# Patient Record
Sex: MALE | Race: WHITE | NOT HISPANIC OR LATINO | Employment: OTHER | URBAN - METROPOLITAN AREA
[De-identification: names, ages, dates, MRNs, and addresses within clinical notes are randomized per-mention and may not be internally consistent; named-entity substitution may affect disease eponyms.]

---

## 2022-04-19 ENCOUNTER — HOSPITAL ENCOUNTER (OUTPATIENT)
Facility: HOSPITAL | Age: 27
Setting detail: OBSERVATION
Discharge: HOME | End: 2022-04-23
Attending: EMERGENCY MEDICINE | Admitting: INTERNAL MEDICINE

## 2022-04-19 ENCOUNTER — APPOINTMENT (OUTPATIENT)
Dept: MRI IMAGING | Facility: HOSPITAL | Age: 27
End: 2022-04-19

## 2022-04-19 ENCOUNTER — APPOINTMENT (OUTPATIENT)
Dept: CT IMAGING | Facility: HOSPITAL | Age: 27
End: 2022-04-19

## 2022-04-19 DIAGNOSIS — R53.1 WEAKNESS: ICD-10-CM

## 2022-04-19 DIAGNOSIS — R55 SYNCOPE, UNSPECIFIED SYNCOPE TYPE: ICD-10-CM

## 2022-04-19 DIAGNOSIS — R56.9 SEIZURE-LIKE ACTIVITY: Primary | ICD-10-CM

## 2022-04-19 LAB
ALBUMIN SERPL-MCNC: 4.6 G/DL (ref 3.5–5.2)
ALBUMIN/GLOB SERPL: 2 G/DL
ALP SERPL-CCNC: 95 U/L (ref 39–117)
ALT SERPL W P-5'-P-CCNC: 17 U/L (ref 1–41)
AMPHET+METHAMPHET UR QL: NEGATIVE
AMPHETAMINES UR QL: NEGATIVE
ANION GAP SERPL CALCULATED.3IONS-SCNC: 13 MMOL/L (ref 5–15)
AST SERPL-CCNC: 19 U/L (ref 1–40)
BARBITURATES UR QL SCN: NEGATIVE
BASOPHILS # BLD AUTO: 0.03 10*3/MM3 (ref 0–0.2)
BASOPHILS NFR BLD AUTO: 0.3 % (ref 0–1.5)
BENZODIAZ UR QL SCN: NEGATIVE
BILIRUB SERPL-MCNC: 0.3 MG/DL (ref 0–1.2)
BILIRUB UR QL STRIP: NEGATIVE
BUN SERPL-MCNC: 14 MG/DL (ref 6–20)
BUN/CREAT SERPL: 14.4 (ref 7–25)
BUPRENORPHINE SERPL-MCNC: NEGATIVE NG/ML
CALCIUM SPEC-SCNC: 9.2 MG/DL (ref 8.6–10.5)
CANNABINOIDS SERPL QL: NEGATIVE
CHLORIDE SERPL-SCNC: 104 MMOL/L (ref 98–107)
CLARITY UR: CLEAR
CO2 SERPL-SCNC: 24 MMOL/L (ref 22–29)
COCAINE UR QL: NEGATIVE
COLOR UR: YELLOW
CREAT SERPL-MCNC: 0.97 MG/DL (ref 0.76–1.27)
DEPRECATED RDW RBC AUTO: 44.2 FL (ref 37–54)
EGFRCR SERPLBLD CKD-EPI 2021: 110.4 ML/MIN/1.73
EOSINOPHIL # BLD AUTO: 0.09 10*3/MM3 (ref 0–0.4)
EOSINOPHIL NFR BLD AUTO: 1 % (ref 0.3–6.2)
ERYTHROCYTE [DISTWIDTH] IN BLOOD BY AUTOMATED COUNT: 12.5 % (ref 12.3–15.4)
ETHANOL BLD-MCNC: <10 MG/DL (ref 0–10)
FLUAV SUBTYP SPEC NAA+PROBE: NOT DETECTED
FLUBV RNA ISLT QL NAA+PROBE: NOT DETECTED
GLOBULIN UR ELPH-MCNC: 2.3 GM/DL
GLUCOSE SERPL-MCNC: 90 MG/DL (ref 65–99)
GLUCOSE UR STRIP-MCNC: NEGATIVE MG/DL
HCT VFR BLD AUTO: 41.6 % (ref 37.5–51)
HGB BLD-MCNC: 13.6 G/DL (ref 13–17.7)
HGB UR QL STRIP.AUTO: NEGATIVE
HOLD SPECIMEN: NORMAL
IMM GRANULOCYTES # BLD AUTO: 0.03 10*3/MM3 (ref 0–0.05)
IMM GRANULOCYTES NFR BLD AUTO: 0.3 % (ref 0–0.5)
KETONES UR QL STRIP: ABNORMAL
LEUKOCYTE ESTERASE UR QL STRIP.AUTO: NEGATIVE
LYMPHOCYTES # BLD AUTO: 2.15 10*3/MM3 (ref 0.7–3.1)
LYMPHOCYTES NFR BLD AUTO: 24.3 % (ref 19.6–45.3)
MAGNESIUM SERPL-MCNC: 2.1 MG/DL (ref 1.6–2.6)
MCH RBC QN AUTO: 31.6 PG (ref 26.6–33)
MCHC RBC AUTO-ENTMCNC: 32.7 G/DL (ref 31.5–35.7)
MCV RBC AUTO: 96.7 FL (ref 79–97)
METHADONE UR QL SCN: NEGATIVE
MONOCYTES # BLD AUTO: 0.73 10*3/MM3 (ref 0.1–0.9)
MONOCYTES NFR BLD AUTO: 8.3 % (ref 5–12)
NEUTROPHILS NFR BLD AUTO: 5.8 10*3/MM3 (ref 1.7–7)
NEUTROPHILS NFR BLD AUTO: 65.8 % (ref 42.7–76)
NITRITE UR QL STRIP: NEGATIVE
NRBC BLD AUTO-RTO: 0 /100 WBC (ref 0–0.2)
OPIATES UR QL: NEGATIVE
OXYCODONE UR QL SCN: NEGATIVE
PCP UR QL SCN: NEGATIVE
PH UR STRIP.AUTO: 6.5 [PH] (ref 5–8)
PLATELET # BLD AUTO: 283 10*3/MM3 (ref 140–450)
PMV BLD AUTO: 9.3 FL (ref 6–12)
POTASSIUM SERPL-SCNC: 3.6 MMOL/L (ref 3.5–5.2)
PROPOXYPH UR QL: NEGATIVE
PROT SERPL-MCNC: 6.9 G/DL (ref 6–8.5)
PROT UR QL STRIP: NEGATIVE
RBC # BLD AUTO: 4.3 10*6/MM3 (ref 4.14–5.8)
SARS-COV-2 RNA PNL SPEC NAA+PROBE: NOT DETECTED
SODIUM SERPL-SCNC: 141 MMOL/L (ref 136–145)
SP GR UR STRIP: 1.01 (ref 1–1.03)
TRICYCLICS UR QL SCN: NEGATIVE
UROBILINOGEN UR QL STRIP: ABNORMAL
WBC NRBC COR # BLD: 8.83 10*3/MM3 (ref 3.4–10.8)
WHOLE BLOOD HOLD SPECIMEN: NORMAL
WHOLE BLOOD HOLD SPECIMEN: NORMAL

## 2022-04-19 PROCEDURE — C9803 HOPD COVID-19 SPEC COLLECT: HCPCS

## 2022-04-19 PROCEDURE — 0 LEVETIRACETAM IN NACL 0.75% 1000 MG/100ML SOLUTION: Performed by: PHYSICIAN ASSISTANT

## 2022-04-19 PROCEDURE — G0378 HOSPITAL OBSERVATION PER HR: HCPCS

## 2022-04-19 PROCEDURE — 99219 PR INITIAL OBSERVATION CARE/DAY 50 MINUTES: CPT | Performed by: STUDENT IN AN ORGANIZED HEALTH CARE EDUCATION/TRAINING PROGRAM

## 2022-04-19 PROCEDURE — 70450 CT HEAD/BRAIN W/O DYE: CPT

## 2022-04-19 PROCEDURE — 0 GADOBENATE DIMEGLUMINE 529 MG/ML SOLUTION: Performed by: EMERGENCY MEDICINE

## 2022-04-19 PROCEDURE — A9577 INJ MULTIHANCE: HCPCS | Performed by: EMERGENCY MEDICINE

## 2022-04-19 PROCEDURE — 80306 DRUG TEST PRSMV INSTRMNT: CPT | Performed by: NURSE PRACTITIONER

## 2022-04-19 PROCEDURE — 96375 TX/PRO/DX INJ NEW DRUG ADDON: CPT

## 2022-04-19 PROCEDURE — 80053 COMPREHEN METABOLIC PANEL: CPT | Performed by: EMERGENCY MEDICINE

## 2022-04-19 PROCEDURE — 96374 THER/PROPH/DIAG INJ IV PUSH: CPT

## 2022-04-19 PROCEDURE — 99284 EMERGENCY DEPT VISIT MOD MDM: CPT

## 2022-04-19 PROCEDURE — 93005 ELECTROCARDIOGRAM TRACING: CPT | Performed by: NURSE PRACTITIONER

## 2022-04-19 PROCEDURE — 81003 URINALYSIS AUTO W/O SCOPE: CPT | Performed by: NURSE PRACTITIONER

## 2022-04-19 PROCEDURE — 70553 MRI BRAIN STEM W/O & W/DYE: CPT

## 2022-04-19 PROCEDURE — 85025 COMPLETE CBC W/AUTO DIFF WBC: CPT | Performed by: EMERGENCY MEDICINE

## 2022-04-19 PROCEDURE — 83735 ASSAY OF MAGNESIUM: CPT | Performed by: NURSE PRACTITIONER

## 2022-04-19 PROCEDURE — 72125 CT NECK SPINE W/O DYE: CPT

## 2022-04-19 PROCEDURE — 25010000002 LORAZEPAM PER 2 MG: Performed by: EMERGENCY MEDICINE

## 2022-04-19 PROCEDURE — 87636 SARSCOV2 & INF A&B AMP PRB: CPT | Performed by: NURSE PRACTITIONER

## 2022-04-19 PROCEDURE — 82077 ASSAY SPEC XCP UR&BREATH IA: CPT | Performed by: EMERGENCY MEDICINE

## 2022-04-19 RX ORDER — LORAZEPAM 2 MG/ML
1 INJECTION INTRAMUSCULAR ONCE
Status: COMPLETED | OUTPATIENT
Start: 2022-04-19 | End: 2022-04-19

## 2022-04-19 RX ORDER — ONDANSETRON 4 MG/1
4 TABLET, FILM COATED ORAL EVERY 6 HOURS PRN
Status: DISCONTINUED | OUTPATIENT
Start: 2022-04-19 | End: 2022-04-23 | Stop reason: HOSPADM

## 2022-04-19 RX ORDER — CHOLECALCIFEROL (VITAMIN D3) 125 MCG
5 CAPSULE ORAL NIGHTLY PRN
Status: DISCONTINUED | OUTPATIENT
Start: 2022-04-19 | End: 2022-04-23 | Stop reason: HOSPADM

## 2022-04-19 RX ORDER — SODIUM CHLORIDE 0.9 % (FLUSH) 0.9 %
10 SYRINGE (ML) INJECTION EVERY 12 HOURS SCHEDULED
Status: DISCONTINUED | OUTPATIENT
Start: 2022-04-19 | End: 2022-04-23 | Stop reason: HOSPADM

## 2022-04-19 RX ORDER — SODIUM CHLORIDE 0.9 % (FLUSH) 0.9 %
10 SYRINGE (ML) INJECTION AS NEEDED
Status: DISCONTINUED | OUTPATIENT
Start: 2022-04-19 | End: 2022-04-23 | Stop reason: HOSPADM

## 2022-04-19 RX ORDER — LEVETIRACETAM 5 MG/ML
500 INJECTION INTRAVASCULAR EVERY 12 HOURS SCHEDULED
Status: DISCONTINUED | OUTPATIENT
Start: 2022-04-20 | End: 2022-04-22

## 2022-04-19 RX ORDER — ACETAMINOPHEN 325 MG/1
650 TABLET ORAL EVERY 4 HOURS PRN
Status: DISCONTINUED | OUTPATIENT
Start: 2022-04-19 | End: 2022-04-23 | Stop reason: HOSPADM

## 2022-04-19 RX ORDER — ONDANSETRON 2 MG/ML
4 INJECTION INTRAMUSCULAR; INTRAVENOUS EVERY 6 HOURS PRN
Status: DISCONTINUED | OUTPATIENT
Start: 2022-04-19 | End: 2022-04-23 | Stop reason: HOSPADM

## 2022-04-19 RX ORDER — LEVETIRACETAM 10 MG/ML
1000 INJECTION INTRAVASCULAR ONCE
Status: COMPLETED | OUTPATIENT
Start: 2022-04-19 | End: 2022-04-19

## 2022-04-19 RX ORDER — SODIUM CHLORIDE 0.9 % (FLUSH) 0.9 %
10 SYRINGE (ML) INJECTION AS NEEDED
Status: DISCONTINUED | OUTPATIENT
Start: 2022-04-19 | End: 2022-04-20 | Stop reason: SDUPTHER

## 2022-04-19 RX ADMIN — Medication 10 ML: at 21:44

## 2022-04-19 RX ADMIN — GADOBENATE DIMEGLUMINE 15 ML: 529 INJECTION, SOLUTION INTRAVENOUS at 17:18

## 2022-04-19 RX ADMIN — LORAZEPAM 1 MG: 2 INJECTION INTRAMUSCULAR; INTRAVENOUS at 16:41

## 2022-04-19 RX ADMIN — SODIUM CHLORIDE 1000 ML: 9 INJECTION, SOLUTION INTRAVENOUS at 17:47

## 2022-04-19 RX ADMIN — Medication 5 MG: at 21:44

## 2022-04-19 RX ADMIN — LEVETIRACETAM 1000 MG: 10 INJECTION INTRAVASCULAR at 20:42

## 2022-04-19 NOTE — ED PROVIDER NOTES
Subjective   Juan Feliciano is a 27 yr old male that presents emergency department for complaints of possible seizure episode.  Patient advises that he was at work today.  Patient works outside as a .  He began to experience dizziness and feeling lightheaded.  Patient had to lay in the grass.  He advises that he laid there and he knows that he saw stars.  Since his eyes rolled back in his head.  Patient developed nausea but denies any vomiting.  Splane that he just did not feel right.  Patient was given 4 mg of Versed per EMS.  Patient advises that he did not feel well yesterday.  Patient explains that he experienced some head pain as well as some left sided jaw pain.  Patient denies any chest pain or shortness of breath.  Patient explains he just does not feel right.  He noticed yesterday that he had an area come up on his forehead.  He denies getting stung but explains that it has doubled in size since.  He indicates that he just does not feel well.  He does not know what is going on but he is not well.      History provided by:  Patient   used: No    Seizures  Seizure type:  Unable to specify  Preceding symptoms: dizziness, headache and nausea    Episode characteristics: tongue biting    Severity:  Moderate  Timing:  Once  Context: not alcohol withdrawal and not drug use    Recent head injury:  No recent head injuries  PTA treatment:  Midazolam  History of seizures: no        Review of Systems   Constitutional: Negative for fever.   Respiratory: Negative for shortness of breath.    Cardiovascular: Negative for chest pain.   Gastrointestinal: Positive for nausea. Negative for vomiting.   Skin: Positive for color change.        Forehead lesion     Neurological: Positive for dizziness, seizures, syncope and headaches.   All other systems reviewed and are negative.      History reviewed. No pertinent past medical history.    No Known Allergies    Past Surgical History:    Procedure Laterality Date   • APPENDECTOMY     • LASIK Right        Family History   Problem Relation Age of Onset   • No Known Problems Mother    • Diabetes Father        Social History     Socioeconomic History   • Marital status: Single   Tobacco Use   • Smoking status: Never Smoker   • Smokeless tobacco: Current User     Types: Chew   Vaping Use   • Vaping Use: Every day   • Substances: Nicotine, THC, Flavoring   Substance and Sexual Activity   • Alcohol use: Yes     Alcohol/week: 7.0 standard drinks     Types: 7 Cans of beer per week   • Drug use: Yes     Frequency: 1.0 times per week     Types: Cocaine(coke), Marijuana           Objective   Physical Exam  Vitals and nursing note reviewed.   Constitutional:       General: He is in acute distress.      Appearance: Normal appearance. He is well-developed. He is ill-appearing. He is not toxic-appearing.      Comments: Patient appears anxious and scared.   HENT:      Head: Normocephalic.        Nose: Nose normal.      Mouth/Throat:      Mouth: Mucous membranes are moist.   Eyes:      General: Lids are normal.      Extraocular Movements: Extraocular movements intact.      Conjunctiva/sclera: Conjunctivae normal.      Pupils: Pupils are equal, round, and reactive to light.   Neck:      Trachea: Trachea normal.   Cardiovascular:      Rate and Rhythm: Normal rate and regular rhythm.      Pulses: Normal pulses.      Heart sounds: Normal heart sounds.   Pulmonary:      Effort: Pulmonary effort is normal. No respiratory distress.      Breath sounds: Normal breath sounds. No decreased breath sounds, wheezing, rhonchi or rales.   Abdominal:      General: Bowel sounds are normal.      Palpations: Abdomen is soft.      Tenderness: There is no abdominal tenderness.   Musculoskeletal:         General: Normal range of motion.      Cervical back: Full passive range of motion without pain and normal range of motion.   Skin:     General: Skin is warm and dry.      Findings: No  rash.   Neurological:      Mental Status: He is alert and oriented to person, place, and time.      Cranial Nerves: No cranial nerve deficit.   Psychiatric:         Speech: Speech normal.         Behavior: Behavior normal. Behavior is cooperative.         Procedures           ED Course  ED Course as of 04/19/22 2347 Tue Apr 19, 2022 1907 Dr. Bragg contacted.  Discussed patient presentation and results with neurology.  He suggests that this is the first episode not to start any kind of medications at this time.  He wants to see the patient in the outpatient setting in 1 week. [KG]   1915 I discussed the results with the patient.  Patient is concerned and feels that he needs to be admitted.  I contacted Dr. Cerna.  He advises to admit to the hospitalist and he will perform an EEG. [KG]   1930 Dr. Elizalde called and agrees to admit the patient.  [KG]      ED Course User Index  [KG] Lovely Green, APRN           Recent Results (from the past 24 hour(s))   Comprehensive Metabolic Panel    Collection Time: 04/19/22  2:44 PM    Specimen: Blood   Result Value Ref Range    Glucose 90 65 - 99 mg/dL    BUN 14 6 - 20 mg/dL    Creatinine 0.97 0.76 - 1.27 mg/dL    Sodium 141 136 - 145 mmol/L    Potassium 3.6 3.5 - 5.2 mmol/L    Chloride 104 98 - 107 mmol/L    CO2 24.0 22.0 - 29.0 mmol/L    Calcium 9.2 8.6 - 10.5 mg/dL    Total Protein 6.9 6.0 - 8.5 g/dL    Albumin 4.60 3.50 - 5.20 g/dL    ALT (SGPT) 17 1 - 41 U/L    AST (SGOT) 19 1 - 40 U/L    Alkaline Phosphatase 95 39 - 117 U/L    Total Bilirubin 0.3 0.0 - 1.2 mg/dL    Globulin 2.3 gm/dL    A/G Ratio 2.0 g/dL    BUN/Creatinine Ratio 14.4 7.0 - 25.0    Anion Gap 13.0 5.0 - 15.0 mmol/L    eGFR 110.4 >60.0 mL/min/1.73   Green Top (Gel)    Collection Time: 04/19/22  2:44 PM   Result Value Ref Range    Extra Tube Hold for add-ons.    Lavender Top    Collection Time: 04/19/22  2:44 PM   Result Value Ref Range    Extra Tube hold for add-on    Gold Top - SST    Collection Time:  04/19/22  2:44 PM   Result Value Ref Range    Extra Tube Hold for add-ons.    Gray Top    Collection Time: 04/19/22  2:44 PM   Result Value Ref Range    Extra Tube Hold for add-ons.    Light Blue Top    Collection Time: 04/19/22  2:44 PM   Result Value Ref Range    Extra Tube hold for add-on    CBC Auto Differential    Collection Time: 04/19/22  2:44 PM    Specimen: Blood   Result Value Ref Range    WBC 8.83 3.40 - 10.80 10*3/mm3    RBC 4.30 4.14 - 5.80 10*6/mm3    Hemoglobin 13.6 13.0 - 17.7 g/dL    Hematocrit 41.6 37.5 - 51.0 %    MCV 96.7 79.0 - 97.0 fL    MCH 31.6 26.6 - 33.0 pg    MCHC 32.7 31.5 - 35.7 g/dL    RDW 12.5 12.3 - 15.4 %    RDW-SD 44.2 37.0 - 54.0 fl    MPV 9.3 6.0 - 12.0 fL    Platelets 283 140 - 450 10*3/mm3    Neutrophil % 65.8 42.7 - 76.0 %    Lymphocyte % 24.3 19.6 - 45.3 %    Monocyte % 8.3 5.0 - 12.0 %    Eosinophil % 1.0 0.3 - 6.2 %    Basophil % 0.3 0.0 - 1.5 %    Immature Grans % 0.3 0.0 - 0.5 %    Neutrophils, Absolute 5.80 1.70 - 7.00 10*3/mm3    Lymphocytes, Absolute 2.15 0.70 - 3.10 10*3/mm3    Monocytes, Absolute 0.73 0.10 - 0.90 10*3/mm3    Eosinophils, Absolute 0.09 0.00 - 0.40 10*3/mm3    Basophils, Absolute 0.03 0.00 - 0.20 10*3/mm3    Immature Grans, Absolute 0.03 0.00 - 0.05 10*3/mm3    nRBC 0.0 0.0 - 0.2 /100 WBC   Magnesium    Collection Time: 04/19/22  2:44 PM    Specimen: Blood   Result Value Ref Range    Magnesium 2.1 1.6 - 2.6 mg/dL   Urinalysis With Microscopic If Indicated (No Culture) - Urine, Clean Catch    Collection Time: 04/19/22  4:26 PM    Specimen: Urine, Clean Catch   Result Value Ref Range    Color, UA Yellow Yellow, Straw    Appearance, UA Clear Clear    pH, UA 6.5 5.0 - 8.0    Specific Gravity, UA 1.010 1.001 - 1.030    Glucose, UA Negative Negative    Ketones, UA Trace (A) Negative    Bilirubin, UA Negative Negative    Blood, UA Negative Negative    Protein, UA Negative Negative    Leuk Esterase, UA Negative Negative    Nitrite, UA Negative Negative     Urobilinogen, UA 0.2 E.U./dL 0.2 - 1.0 E.U./dL   Urine Drug Screen - Urine, Clean Catch    Collection Time: 04/19/22  4:26 PM    Specimen: Urine, Clean Catch   Result Value Ref Range    THC, Screen, Urine Negative Negative    Phencyclidine (PCP), Urine Negative Negative    Cocaine Screen, Urine Negative Negative    Methamphetamine, Ur Negative Negative    Opiate Screen Negative Negative    Amphetamine Screen, Urine Negative Negative    Benzodiazepine Screen, Urine Negative Negative    Tricyclic Antidepressants Screen Negative Negative    Methadone Screen, Urine Negative Negative    Barbiturates Screen, Urine Negative Negative    Oxycodone Screen, Urine Negative Negative    Propoxyphene Screen Negative Negative    Buprenorphine, Screen, Urine Negative Negative   Ethanol    Collection Time: 04/19/22  5:46 PM    Specimen: Blood   Result Value Ref Range    Ethanol <10 0 - 10 mg/dL   COVID-19 and FLU A/B PCR - Swab, Nasopharynx    Collection Time: 04/19/22  6:37 PM    Specimen: Nasopharynx; Swab   Result Value Ref Range    COVID19 Not Detected Not Detected - Ref. Range    Influenza A PCR Not Detected Not Detected    Influenza B PCR Not Detected Not Detected     Note: In addition to lab results from this visit, the labs listed above may include labs taken at another facility or during a different encounter within the last 24 hours. Please correlate lab times with ED admission and discharge times for further clarification of the services performed during this visit.    MRI Brain With & Without Contrast   Final Result   Negative exam. No findings to account for seizure       This report was finalized on 4/19/2022 5:29 PM by Wilmer Henriquez.          CT Head Without Contrast   Final Result       1. No acute intracranial abnormality.       This report was finalized on 4/19/2022 4:46 PM by Maikel Franks MD.          CT Cervical Spine Without Contrast   Final Result   Negative       This report was finalized on 4/19/2022 4:05  PM by Wilmer Henriquez.            Vitals:    04/19/22 2030 04/19/22 2035 04/19/22 2040 04/19/22 2119   BP: 120/73 116/65 110/69 136/79   BP Location:    Right arm   Patient Position:    Lying   Pulse:    114   Resp:    18   Temp:    99.1 °F (37.3 °C)   TempSrc:    Oral   SpO2: 98% 95% 92% 99%   Weight:       Height:         Medications   sodium chloride 0.9 % flush 10 mL (has no administration in time range)   sodium chloride 0.9 % flush 10 mL (has no administration in time range)   levETIRAcetam in NaCl 0.82% (KEPPRA) IVPB 500 mg (has no administration in time range)   sodium chloride 0.9 % flush 10 mL (10 mL Intravenous Given 4/19/22 2144)   sodium chloride 0.9 % flush 10 mL (has no administration in time range)   acetaminophen (TYLENOL) tablet 650 mg (has no administration in time range)   melatonin tablet 5 mg (5 mg Oral Given 4/19/22 2144)   ondansetron (ZOFRAN) tablet 4 mg (has no administration in time range)     Or   ondansetron (ZOFRAN) injection 4 mg (has no administration in time range)   LORazepam (ATIVAN) injection 1 mg (1 mg Intravenous Given 4/19/22 1641)   sodium chloride 0.9 % bolus 1,000 mL (0 mL Intravenous Stopped 4/19/22 1837)   gadobenate dimeglumine (MULTIHANCE) injection 15 mL (15 mL Intravenous Given 4/19/22 1718)   levETIRAcetam in NaCl 0.75% (KEPPRA) IVPB 1,000 mg (1,000 mg Intravenous New Bag 4/19/22 2042)     ECG/EMG Results (last 24 hours)     Procedure Component Value Units Date/Time    ECG 12 Lead [[id number]] Collected: 04/19/22 1841     Updated: 04/19/22 1841        ECG 12 Lead   Preliminary Result                                                   MDM    Final diagnoses:   Seizure-like activity (HCC)   Syncope, unspecified syncope type   Weakness       ED Disposition  ED Disposition     ED Disposition   Decision to Admit    Condition   --    Comment   Level of Care: Med/Surg [1]   Diagnosis: Seizure-like activity (HCC) [284525]   Admitting Physician: ALLIE GUZMÁN [[id number]]                Narciso Chin MD  [id number] [address]  [phone]               Medication List      No changes were made to your prescriptions during this visit.          Lovely Green, APRN  04/19/22 9760

## 2022-04-19 NOTE — H&P
[facility] Medicine Services  HISTORY AND PHYSICAL    Patient Name: Juan Feliciano  : 1995  MRN: [id number]  Primary Care Physician: Provider, No Known  Date of admission: 2022    Subjective   Subjective     Chief Complaint:  Seizure-like activity    HPI:  Juan Feliciano is a 27 y.o. male who is otherwise healthy who presents to [facility] ED for complaint of seizure-like activity. 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 He states he went in to work today feeling fine, and as he finished climbing up a pole, he became light headed and became nauseous. He decided to come down to rest,  and then became more dizzy and then suddenly became unresponsive and began shaking uncontrollably and eyes rolled into back of head. This was reportedly witnessed by his boss. He states it lasted 20 minutes or so before spontaneously resolving. He does not remember anything during the event. His boss called EMS, and was brought here for further evaluation and care. Its reported he had 2 more lasting about 10 minutes each, also witnessed by boss and family members before EMS arrived.He denies fever, chills, chest pain, cough, shortness of breath, abdominal pain, vomiting, or diarrhea. He has no prior history of seizure activity. Takes no daily medications. He admits to vaping and chewing tobacco. Admits to very rare marijuana use. Social alcohol use.        Review of Systems   Constitutional: Positive for fatigue. Negative for activity change, appetite change, chills, fever and unexpected weight change.   HENT: Negative for postnasal drip, rhinorrhea and trouble swallowing.    Eyes: Negative for photophobia and visual disturbance.   Respiratory: Negative for cough, shortness of breath and wheezing.    Cardiovascular: Negative for chest pain, palpitations and leg swelling.   Gastrointestinal: Positive for nausea. Negative for abdominal pain,  diarrhea and vomiting.   Genitourinary: Negative for dysuria and hematuria.   Musculoskeletal: Negative for arthralgias and myalgias.   Skin: Positive for wound (Lesion on middle of forehead).   Neurological: Positive for dizziness, seizures and light-headedness.   Psychiatric/Behavioral: The patient is nervous/anxious.       All other systems reviewed and are negative.     Personal History     History reviewed. No pertinent past medical history.          Past Surgical History:   Procedure Laterality Date   • APPENDECTOMY     • LASIK Right        Family History:  family history includes Diabetes in his father; No Known Problems in his mother. Otherwise pertinent FHx was reviewed and unremarkable.     Social History:  reports that he has never smoked. His smokeless tobacco use includes chew. He reports current alcohol use of about 7.0 standard drinks of alcohol per week. He reports current drug use. Frequency: 1.00 time per week. Drugs: Cocaine(coke) and Marijuana.  Social History     Social History Narrative   • Not on file       Medications:       No Known Allergies    Objective   Objective     Vital Signs:   Temp:  [99 °F (37.2 °C)] 99 °F (37.2 °C)  Heart Rate:  [84-93] 92  Resp:  [16] 16  BP: (113-145)/(59-96) 124/96    Physical Exam  Constitutional:       General: He is not in acute distress.     Appearance: Normal appearance. He is not ill-appearing.   HENT:      Head: Atraumatic.      Right Ear: External ear normal.      Left Ear: External ear normal.      Nose: Nose normal.   Eyes:      Extraocular Movements: Extraocular movements intact.      Conjunctiva/sclera: Conjunctivae normal.      Pupils: Pupils are equal, round, and reactive to light.   Cardiovascular:      Rate and Rhythm: Normal rate and regular rhythm.      Pulses: Normal pulses.      Heart sounds: Normal heart sounds. No murmur heard.  Pulmonary:      Effort: Pulmonary effort is normal. No respiratory distress.      Breath sounds: Normal breath  sounds. No wheezing, rhonchi or rales.   Abdominal:      General: Bowel sounds are normal. There is no distension.      Tenderness: There is no abdominal tenderness. There is no guarding or rebound.   Musculoskeletal:         General: Normal range of motion.      Cervical back: No rigidity.      Right lower leg: No edema.      Left lower leg: No edema.   Skin:     General: Skin is warm and dry.      Coloration: Skin is not jaundiced.      Findings: Lesion (Large cystic appearing lesion between eyebrows on forehead. erythematous. ) present. No rash.   Neurological:      General: No focal deficit present.      Mental Status: He is alert and oriented to person, place, and time.      Comments: 2 witnessed seizures during exam. Patient became unresponsive with clonic movements. Lasted about 20 seconds each before spontaneously resolving. Patient appears fatigued toward end of exam.    Psychiatric:         Attention and Perception: Attention normal.         Mood and Affect: Mood normal.         Behavior: Behavior normal.         Thought Content: Thought content normal.            Result Review:  I have personally reviewed the results from the time of this admission to 04/19/22 7:44 PM EDT and agree with these findings:  [x]  Laboratory  [x]  Microbiology  [x]  Radiology  [x]  EKG/Telemetry   []  Cardiology/Vascular   []  Pathology  []  Old records  []  Other:      LAB RESULTS:      Lab 04/19/22  1444   WBC 8.83   HEMOGLOBIN 13.6   HEMATOCRIT 41.6   PLATELETS 283   NEUTROS ABS 5.80   IMMATURE GRANS (ABS) 0.03   LYMPHS ABS 2.15   MONOS ABS 0.73   EOS ABS 0.09   MCV 96.7         Lab 04/19/22  1444   SODIUM 141   POTASSIUM 3.6   CHLORIDE 104   CO2 24.0   ANION GAP 13.0   BUN 14   CREATININE 0.97   EGFR 110.4   GLUCOSE 90   CALCIUM 9.2   MAGNESIUM 2.1         Lab 04/19/22  1444   TOTAL PROTEIN 6.9   ALBUMIN 4.60   GLOBULIN 2.3   ALT (SGPT) 17   AST (SGOT) 19   BILIRUBIN 0.3   ALK PHOS 95                     UA    Urinalysis  4/19/22   Specific [place], UA 1.010   Ketones, UA Trace (A)   Blood, UA Negative   Leukocytes, UA Negative   Nitrite, UA Negative   (A) Abnormal value            Microbiology Results (last 10 days)     Procedure Component Value - Date/Time    COVID PRE-OP / PRE-PROCEDURE SCREENING ORDER (NO ISOLATION) - Swab, Nasopharynx [[id number]]  (Normal) Collected: 04/19/22 1837    Lab Status: Final result Specimen: Swab from Nasopharynx Updated: 04/19/22 1909    Narrative:      The following orders were created for panel order COVID PRE-OP / PRE-PROCEDURE SCREENING ORDER (NO ISOLATION) - Swab, Nasopharynx.  Procedure                               Abnormality         Status                     ---------                               -----------         ------                     COVID-19 and FLU A/B PCR...[[id number]]  Normal              Final result                 Please view results for these tests on the individual orders.    COVID-19 and FLU A/B PCR - Swab, Nasopharynx [[id number]]  (Normal) Collected: 04/19/22 1837    Lab Status: Final result Specimen: Swab from Nasopharynx Updated: 04/19/22 1909     COVID19 Not Detected     Influenza A PCR Not Detected     Influenza B PCR Not Detected    Narrative:      Fact sheet for providers: https://www.fda.gov/media/191134/download    Fact sheet for patients: https://www.fda.gov/media/655571/download    Test performed by PCR.          CT Head Without Contrast    Result Date: 4/19/2022  CT HEAD WO CONTRAST-  Date of Exam: 4/19/2022 3:48 PM  Indication: headache, seizure.  Comparison Exams: None available.  Technique: Multiple axial images were obtained from the skull base to the vertex without the administration of IV contrast. The axial data was used to generate reformatted images in the coronal and sagittal planes. Automated exposure control and iterative reconstruction methods were used.  FINDINGS: There is no acute infarct or hemorrhage.   No abnormal extra-axial fluid  collections are seen.   There is no mass effect or hydrocephalus.  There is no evidence of skull fracture.   Visualized paranasal sinuses and mastoid air cells are clear.  The globes and orbits are within normal limits.      Impression:  1. No acute intracranial abnormality.  This report was finalized on 4/19/2022 4:46 PM by Maikel Franks MD.      CT Cervical Spine Without Contrast    Result Date: 4/19/2022  DATE OF EXAM: 4/19/2022 3:48 PM  PROCEDURE: CT CERVICAL SPINE WO CONTRAST-  INDICATIONS: neck pain, new onset seizure  COMPARISON: No comparisons available.  TECHNIQUE: Routine transaxial slices were obtained through the cervical spine without the administration of intravenous contrast. Reconstructed coronal and sagittal images were also obtained. Automated exposure control and iterative construction methods were used.  The radiation dose reduction device was turned on for each scan per the ALARA (As Low as Reasonably Achievable) protocol.  FINDINGS: No evidence of fracture or subluxation. No significant degenerative changes. Prevertebral soft tissues normal      Impression: Negative  This report was finalized on 4/19/2022 4:05 PM by Wilmer Hneriquez.      MRI Brain With & Without Contrast    Result Date: 4/19/2022  DATE OF EXAM: 4/19/2022 5:18 PM  PROCEDURE: MRI BRAIN W WO CONTRAST-  INDICATIONS: headache, dizziness, seizure vs sycope  COMPARISON: No comparisons available.  TECHNIQUE: Multiplanar multisequence images of the brain were performed prior to and after the uneventful intravenous contrast administration of 15 MultiHance.  FINDINGS: There are no foci of restricted diffusion. There are no intra-axial signal abnormalities. Specifically, there is no vasogenic edema, cortical signal abnormality, or abnormal T2 signal or atrophy of the mesial temporal region. There are no intra-axial or extra-axial contrast enhancing abnormalities. No heterotopic gray matter or developmental anomaly is demonstrated. The CSF  spaces/ventricles are normal. There are no abnormal extra-axial fluid collections or masses. Cerebellar tonsils are in normal position. No sellar/suprasellar lesion is demonstrated. The major intracranial flow voids are present. No skull lesion is demonstrated. No orbital abnormality is demonstrated. There is no fluid in the paranasal sinuses/middle ear cavities      Impression: Negative exam. No findings to account for seizure  This report was finalized on 4/19/2022 5:29 PM by Wilmer Henriquez.            Assessment/Plan   Assessment & Plan       Seizure-like activity (HCC)    Juan Feliciano is a 27 y.o. male who is otherwise healthy who presents to [facility] ED for complaint of seizure-like activity. No prior history of seizure disorder.     Seizure-like Activity  -Patient currently stable and in no acute distress. VSS on room air.   -He did have 2 episodes of seizure-like activity during the exam, each lasting about 10-20 seconds before spontaneously resolving.   -CT head and neck unremarkable  -MRI brain unremarkable  -Urine drug screen negative  -Ativan given in the ED prior to examination. 1L NS bolus given as well.  -Will give loading dose of Keppra, as well as start scheduled keppra  -Neurology to see in the am.  -Seizure precautions  -Zofran for nausea  -Repeat labs in the am      DVT prophylaxis:  SCDS    CODE STATUS:  Full Code       This note has been completed as part of a split-shared workflow.   Signature: Electronically signed by Kathy Miranda PA-C, 04/19/22, 7:44 PM EDT        Attending   Admission Attestation       I have seen and examined the patient, performing an independent face-to-face diagnostic evaluation with plan of care reviewed and developed with the advanced practice clinician (APC).      Brief Summary Statement:   Juan Feliciano is a 27 y.o. male with recent stressors who has been working 7 days a week and works outside presenting with convulsive type  episodes.  He states that this happened earlier today and lasted about 40 minutes.  This was witnessed by his boss.  He denies any recent drug use and no increased alcohol intake no recent medication changes and no change in p.o. intake.  He has had several of these episodes while in the ED and they have varied presentations.  Per nursing staff he notices when he has a sternal rub.  But other times he says he becomes confused.  His family is currently face timing him and very anxious about everything that is happening.  He did have an episode for me that lasted a couple of seconds.  He rolled his eyes backwards and vigorously was shaking his bilateral upper extremities.  Remainder of detailed HPI is as noted by APC and has been reviewed and/or edited by me for completeness.    Attending Physical Exam:  Constitutional: No acute distress, awake, alert  HENT: NCAT, mucous membranes moist  Respiratory: Clear to auscultation bilaterally, respiratory effort normal   Cardiovascular: RRR, no murmurs, rubs, or gallops  Gastrointestinal: Positive bowel sounds, soft, nontender, nondistended  Musculoskeletal: No bilateral ankle edema  Psychiatric: Appropriate affect, cooperative  Neurologic: Oriented x 3, strength symmetric in all extremities, Cranial Nerves grossly intact to confrontation, speech clear  Skin: No rashes      Brief Assessment/Plan :  See detailed assessment and plan developed with APC which I have reviewed and/or edited for completeness.    Juan Feliciano is a 27-year-old male who is being admitted for convulsive type episodes.  Less suspicious that this is epileptic seizures however will empirically cover with Keppra for now, frequent neurochecks and seizure precautions.  Consult neurology in a.m.  ED has already spoken to neurology who recommends overnight admission and will follow up and a.m.    Admission Status: I believe that this patient meets observation criteria    Mervat Garrido MD  04/19/22

## 2022-04-20 ENCOUNTER — APPOINTMENT (OUTPATIENT)
Dept: NEUROLOGY | Facility: HOSPITAL | Age: 27
End: 2022-04-20

## 2022-04-20 PROBLEM — R56.9 SEIZURE-LIKE ACTIVITY: Status: RESOLVED | Noted: 2022-04-19 | Resolved: 2022-04-20

## 2022-04-20 LAB
ANION GAP SERPL CALCULATED.3IONS-SCNC: 7 MMOL/L (ref 5–15)
BASOPHILS # BLD AUTO: 0.04 10*3/MM3 (ref 0–0.2)
BASOPHILS NFR BLD AUTO: 0.4 % (ref 0–1.5)
BUN SERPL-MCNC: 13 MG/DL (ref 6–20)
BUN/CREAT SERPL: 14.1 (ref 7–25)
CALCIUM SPEC-SCNC: 8.9 MG/DL (ref 8.6–10.5)
CHLORIDE SERPL-SCNC: 108 MMOL/L (ref 98–107)
CK SERPL-CCNC: 97 U/L (ref 20–200)
CO2 SERPL-SCNC: 26 MMOL/L (ref 22–29)
CREAT SERPL-MCNC: 0.92 MG/DL (ref 0.76–1.27)
DEPRECATED RDW RBC AUTO: 43.5 FL (ref 37–54)
EGFRCR SERPLBLD CKD-EPI 2021: 117.7 ML/MIN/1.73
EOSINOPHIL # BLD AUTO: 0.15 10*3/MM3 (ref 0–0.4)
EOSINOPHIL NFR BLD AUTO: 1.7 % (ref 0.3–6.2)
ERYTHROCYTE [DISTWIDTH] IN BLOOD BY AUTOMATED COUNT: 12.7 % (ref 12.3–15.4)
GLUCOSE SERPL-MCNC: 95 MG/DL (ref 65–99)
HCT VFR BLD AUTO: 39.6 % (ref 37.5–51)
HGB BLD-MCNC: 13.2 G/DL (ref 13–17.7)
IMM GRANULOCYTES # BLD AUTO: 0.04 10*3/MM3 (ref 0–0.05)
IMM GRANULOCYTES NFR BLD AUTO: 0.4 % (ref 0–0.5)
LYMPHOCYTES # BLD AUTO: 3.17 10*3/MM3 (ref 0.7–3.1)
LYMPHOCYTES NFR BLD AUTO: 35 % (ref 19.6–45.3)
MCH RBC QN AUTO: 31.1 PG (ref 26.6–33)
MCHC RBC AUTO-ENTMCNC: 33.3 G/DL (ref 31.5–35.7)
MCV RBC AUTO: 93.2 FL (ref 79–97)
MONOCYTES # BLD AUTO: 0.92 10*3/MM3 (ref 0.1–0.9)
MONOCYTES NFR BLD AUTO: 10.1 % (ref 5–12)
NEUTROPHILS NFR BLD AUTO: 4.75 10*3/MM3 (ref 1.7–7)
NEUTROPHILS NFR BLD AUTO: 52.4 % (ref 42.7–76)
NRBC BLD AUTO-RTO: 0 /100 WBC (ref 0–0.2)
PLATELET # BLD AUTO: 288 10*3/MM3 (ref 140–450)
PMV BLD AUTO: 9.4 FL (ref 6–12)
POTASSIUM SERPL-SCNC: 3.7 MMOL/L (ref 3.5–5.2)
QT INTERVAL: 354 MS
QTC INTERVAL: 435 MS
RBC # BLD AUTO: 4.25 10*6/MM3 (ref 4.14–5.8)
SODIUM SERPL-SCNC: 141 MMOL/L (ref 136–145)
WBC NRBC COR # BLD: 9.07 10*3/MM3 (ref 3.4–10.8)

## 2022-04-20 PROCEDURE — 95816 EEG AWAKE AND DROWSY: CPT

## 2022-04-20 PROCEDURE — 80048 BASIC METABOLIC PNL TOTAL CA: CPT | Performed by: PHYSICIAN ASSISTANT

## 2022-04-20 PROCEDURE — 25010000002 LEVETIRACETAM IN NACL 0.82% 500 MG/100ML SOLUTION: Performed by: PHYSICIAN ASSISTANT

## 2022-04-20 PROCEDURE — 99225 PR SBSQ OBSERVATION CARE/DAY 25 MINUTES: CPT | Performed by: INTERNAL MEDICINE

## 2022-04-20 PROCEDURE — G0378 HOSPITAL OBSERVATION PER HR: HCPCS

## 2022-04-20 PROCEDURE — 82550 ASSAY OF CK (CPK): CPT | Performed by: INTERNAL MEDICINE

## 2022-04-20 PROCEDURE — 85025 COMPLETE CBC W/AUTO DIFF WBC: CPT | Performed by: PHYSICIAN ASSISTANT

## 2022-04-20 RX ADMIN — LEVETIRACETAM 500 MG: 5 INJECTION INTRAVENOUS at 20:49

## 2022-04-20 RX ADMIN — Medication 10 ML: at 20:51

## 2022-04-20 RX ADMIN — LEVETIRACETAM 500 MG: 5 INJECTION INTRAVENOUS at 11:30

## 2022-04-20 RX ADMIN — Medication 5 MG: at 20:49

## 2022-04-20 NOTE — PROGRESS NOTES
[facility] Medicine Services  PROGRESS NOTE    Patient Name: Juan Feliciano  : 1995  MRN: [id number]    Date of Admission: 2022  Primary Care Physician: Provider, No Known    Subjective   Subjective     CC: spells    HPI: Patient with several spells overnight and again as I am in room with him. Prior to his spell he told me that he was about to have another. He was immediately lucid and back to baseline following said spell.    ROS:  Gen- No fevers, chills  CV- No chest pain, palpitations  Resp- No cough, dyspnea  GI- No N/V/D, abd pain     Objective   Objective     Vital Signs:   Temp:  [98.1 °F (36.7 °C)-99.1 °F (37.3 °C)] 98.1 °F (36.7 °C)  Heart Rate:  [] 82  Resp:  [16-20] 20  BP: (107-145)/(54-96) 121/89     Physical Exam:  Constitutional: No acute distress, awake, alert  HENT: NCAT, mucous membranes moist  Respiratory: Clear to auscultation bilaterally, respiratory effort normal   Cardiovascular: RRR, no murmurs, rubs, or gallops  Gastrointestinal: Positive bowel sounds, soft, nontender, nondistended  Musculoskeletal: No bilateral ankle edema  Psychiatric: Appropriate affect, cooperative  Neurologic: Oriented x 3, strength symmetric in all extremities, Cranial Nerves grossly intact to confrontation, speech clear  Skin: No rashes    Results Reviewed:  LAB RESULTS:      Lab 22  0352 22  1444   WBC 9.07 8.83   HEMOGLOBIN 13.2 13.6   HEMATOCRIT 39.6 41.6   PLATELETS 288 283   NEUTROS ABS 4.75 5.80   IMMATURE GRANS (ABS) 0.04 0.03   LYMPHS ABS 3.17* 2.15   MONOS ABS 0.92* 0.73   EOS ABS 0.15 0.09   MCV 93.2 96.7         Lab 22  0352 22  1444   SODIUM 141 141   POTASSIUM 3.7 3.6   CHLORIDE 108* 104   CO2 26.0 24.0   ANION GAP 7.0 13.0   BUN 13 14   CREATININE 0.92 0.97   EGFR 117.7 110.4   GLUCOSE 95 90   CALCIUM 8.9 9.2   MAGNESIUM  --  2.1         Lab 22  1444   TOTAL PROTEIN 6.9   ALBUMIN 4.60   GLOBULIN 2.3   ALT (SGPT) 17    AST (SGOT) 19   BILIRUBIN 0.3   ALK PHOS 95                     Brief Urine Lab Results  (Last result in the past 365 days)      Color   Clarity   Blood   Leuk Est   Nitrite   Protein   CREAT   Urine HCG        04/19/22 1626 Yellow   Clear   Negative   Negative   Negative   Negative                 Microbiology Results Abnormal     Procedure Component Value - Date/Time    COVID PRE-OP / PRE-PROCEDURE SCREENING ORDER (NO ISOLATION) - Swab, Nasopharynx [[id number]]  (Normal) Collected: 04/19/22 1837    Lab Status: Final result Specimen: Swab from Nasopharynx Updated: 04/19/22 1909    Narrative:      The following orders were created for panel order COVID PRE-OP / PRE-PROCEDURE SCREENING ORDER (NO ISOLATION) - Swab, Nasopharynx.  Procedure                               Abnormality         Status                     ---------                               -----------         ------                     COVID-19 and FLU A/B PCR...[[id number]]  Normal              Final result                 Please view results for these tests on the individual orders.    COVID-19 and FLU A/B PCR - Swab, Nasopharynx [[id number]]  (Normal) Collected: 04/19/22 1837    Lab Status: Final result Specimen: Swab from Nasopharynx Updated: 04/19/22 1909     COVID19 Not Detected     Influenza A PCR Not Detected     Influenza B PCR Not Detected    Narrative:      Fact sheet for providers: https://www.fda.gov/media/343445/download    Fact sheet for patients: https://www.fda.gov/media/504142/download    Test performed by PCR.          CT Head Without Contrast    Result Date: 4/19/2022  CT HEAD WO CONTRAST-  Date of Exam: 4/19/2022 3:48 PM  Indication: headache, seizure.  Comparison Exams: None available.  Technique: Multiple axial images were obtained from the skull base to the vertex without the administration of IV contrast. The axial data was used to generate reformatted images in the coronal and sagittal planes. Automated exposure control and  iterative reconstruction methods were used.  FINDINGS: There is no acute infarct or hemorrhage.   No abnormal extra-axial fluid collections are seen.   There is no mass effect or hydrocephalus.  There is no evidence of skull fracture.   Visualized paranasal sinuses and mastoid air cells are clear.  The globes and orbits are within normal limits.      Impression:  1. No acute intracranial abnormality.  This report was finalized on 4/19/2022 4:46 PM by Maikel Franks MD.      CT Cervical Spine Without Contrast    Result Date: 4/19/2022  DATE OF EXAM: 4/19/2022 3:48 PM  PROCEDURE: CT CERVICAL SPINE WO CONTRAST-  INDICATIONS: neck pain, new onset seizure  COMPARISON: No comparisons available.  TECHNIQUE: Routine transaxial slices were obtained through the cervical spine without the administration of intravenous contrast. Reconstructed coronal and sagittal images were also obtained. Automated exposure control and iterative construction methods were used.  The radiation dose reduction device was turned on for each scan per the ALARA (As Low as Reasonably Achievable) protocol.  FINDINGS: No evidence of fracture or subluxation. No significant degenerative changes. Prevertebral soft tissues normal      Impression: Negative  This report was finalized on 4/19/2022 4:05 PM by Wilmer Henriquez.      MRI Brain With & Without Contrast    Result Date: 4/19/2022  DATE OF EXAM: 4/19/2022 5:18 PM  PROCEDURE: MRI BRAIN W WO CONTRAST-  INDICATIONS: headache, dizziness, seizure vs sycope  COMPARISON: No comparisons available.  TECHNIQUE: Multiplanar multisequence images of the brain were performed prior to and after the uneventful intravenous contrast administration of 15 MultiHance.  FINDINGS: There are no foci of restricted diffusion. There are no intra-axial signal abnormalities. Specifically, there is no vasogenic edema, cortical signal abnormality, or abnormal T2 signal or atrophy of the mesial temporal region. There are no  intra-axial or extra-axial contrast enhancing abnormalities. No heterotopic gray matter or developmental anomaly is demonstrated. The CSF spaces/ventricles are normal. There are no abnormal extra-axial fluid collections or masses. Cerebellar tonsils are in normal position. No sellar/suprasellar lesion is demonstrated. The major intracranial flow voids are present. No skull lesion is demonstrated. No orbital abnormality is demonstrated. There is no fluid in the paranasal sinuses/middle ear cavities      Impression: Negative exam. No findings to account for seizure  This report was finalized on 4/19/2022 5:29 PM by Wilmer Henriquez.            I have reviewed the medications:  Scheduled Meds:levETIRAcetam, 500 mg, Intravenous, Q12H  sodium chloride, 10 mL, Intravenous, Q12H      Continuous Infusions:   PRN Meds:.•  acetaminophen  •  melatonin  •  ondansetron **OR** ondansetron  •  [COMPLETED] Insert peripheral IV **AND** sodium chloride    Assessment/Plan   Assessment & Plan     Active Hospital Problems    Diagnosis  POA   • Seizure-like activity (HCC) [R56.9]  Yes      Resolved Hospital Problems   No resolved problems to display.        Brief Hospital Course to date:  Juan Feliciano is a 27 y.o. male with recent stressors who has been working 7 days a week and works outside presenting with convulsive type episodes. He states that this happened earlier today and lasted about 40 minutes.    Spells  -Labs, imaging, UDS unremarkable. Given frequency and fluctuating nature of spells along with no post ictal period favor conversion d/o triggered by being away from home and work stressors but neurology consult is currently pending.  -He works as a  so obviously he cannot climb to any heights safely at this time. This was d/w him.  -Will add CK to previous labs.  -EEG is pending.  -Continue IV keppra for now.    This patient's problems and plans were partially entered by my partner and updated as appropriate  by me 04/20/22.    DVT prophylaxis:  Mechanical DVT prophylaxis orders are present.            Disposition: I expect the patient to be discharged TBD.    CODE STATUS:   Code Status and Medical Interventions:   Ordered at: 04/19/22 2031     Code Status (Patient has no pulse and is not breathing):    CPR (Attempt to Resuscitate)     Medical Interventions (Patient has pulse or is breathing):    Full Support       Duyen Barnes II, DO  04/20/22

## 2022-04-20 NOTE — PROGRESS NOTES
Clinical Nutrition       Patient Name: Juan Feliciano  YOB: 1995  MRN: [id number]  Date of Encounter: 04/20/22 14:22 EDT  Admission date: 4/19/2022      Reason for Visit   Identified at risk by screening criteria, MST score 2+      EMR  Reviewed   Yes       Reported/Observed/Food/Nutrition Related - Comments   Patient reports he dislocated his ankle and tore some tendons back on 8/10/21 and gained 25-30 lbs during recovery. Since recovering and being able to be active again he has lost about 20 lbs. Reports wt of 170 lbs prior to injury last year and now weight of 185 lbs. Reports good appetite and PO intake prior to admission. Denies food allergies and difficulty chewing/swallowing. Reports he hasn't eaten very well since being admitted yesterday. Agreeable to vanilla ONS drinks.       Current Nutrition Prescription     Diet Regular    Average Intake from Charting: insufficient data    Actions     Follow treatment progress, Care plan reviewed, Encourage intake, Supplement provided    -Ordered vanilla Premier Protein 2x daily (or Ensure HP if Premier not in stock).    Monitor Per Protocol    Carmita Donohue RD  Time Spent: 30 min

## 2022-04-20 NOTE — CASE MANAGEMENT/SOCIAL WORK
Continued Stay Note  [facility]     Patient Name: Juan Feliciano  MRN: [id number]  Today's Date: 4/20/2022    Admit Date: 4/19/2022     Discharge Plan     Row Name 04/20/22 1432       Plan    Plan Home    Plan Comments I spoke with patient in regards to discharge planning. He is in [facility] but lives in [place]. His father is here and will transport him. He has no PCP. He is uninsured. He reportedly is independent with ADL's. I suggest meds to bed.    Final Discharge Disposition Code 01 - home or self-care               Discharge Codes    No documentation.                     Richelle Barton RN

## 2022-04-20 NOTE — PLAN OF CARE
Goal Outcome Evaluation:  Plan of Care Reviewed With: patient        Progress: no change     Patient is alert and oriented, but has started to become more drowsy as the night has gone on and has had little sleep d/t seizure-like activity. No complaints of pain. Patient has had several seizure episodes throughout the night that have lasted between 5 and 20 seconds. Voids spontaneously per urinal. Patient is a 1 assist for transferring and walking. V/S have remained stable. Will continue to monitor.

## 2022-04-21 LAB
ANION GAP SERPL CALCULATED.3IONS-SCNC: 8 MMOL/L (ref 5–15)
BASOPHILS # BLD AUTO: 0.02 10*3/MM3 (ref 0–0.2)
BASOPHILS NFR BLD AUTO: 0.3 % (ref 0–1.5)
BUN SERPL-MCNC: 12 MG/DL (ref 6–20)
BUN/CREAT SERPL: 12.6 (ref 7–25)
CALCIUM SPEC-SCNC: 9.2 MG/DL (ref 8.6–10.5)
CHLORIDE SERPL-SCNC: 109 MMOL/L (ref 98–107)
CO2 SERPL-SCNC: 25 MMOL/L (ref 22–29)
CREAT SERPL-MCNC: 0.95 MG/DL (ref 0.76–1.27)
DEPRECATED RDW RBC AUTO: 43.9 FL (ref 37–54)
EGFRCR SERPLBLD CKD-EPI 2021: 113.2 ML/MIN/1.73
EOSINOPHIL # BLD AUTO: 0.14 10*3/MM3 (ref 0–0.4)
EOSINOPHIL NFR BLD AUTO: 2.1 % (ref 0.3–6.2)
ERYTHROCYTE [DISTWIDTH] IN BLOOD BY AUTOMATED COUNT: 12.8 % (ref 12.3–15.4)
GLUCOSE SERPL-MCNC: 100 MG/DL (ref 65–99)
HCT VFR BLD AUTO: 42.7 % (ref 37.5–51)
HGB BLD-MCNC: 14.2 G/DL (ref 13–17.7)
IMM GRANULOCYTES # BLD AUTO: 0.03 10*3/MM3 (ref 0–0.05)
IMM GRANULOCYTES NFR BLD AUTO: 0.4 % (ref 0–0.5)
LYMPHOCYTES # BLD AUTO: 2.51 10*3/MM3 (ref 0.7–3.1)
LYMPHOCYTES NFR BLD AUTO: 37.1 % (ref 19.6–45.3)
MCH RBC QN AUTO: 31.5 PG (ref 26.6–33)
MCHC RBC AUTO-ENTMCNC: 33.3 G/DL (ref 31.5–35.7)
MCV RBC AUTO: 94.7 FL (ref 79–97)
MONOCYTES # BLD AUTO: 0.75 10*3/MM3 (ref 0.1–0.9)
MONOCYTES NFR BLD AUTO: 11.1 % (ref 5–12)
NEUTROPHILS NFR BLD AUTO: 3.32 10*3/MM3 (ref 1.7–7)
NEUTROPHILS NFR BLD AUTO: 49 % (ref 42.7–76)
NRBC BLD AUTO-RTO: 0 /100 WBC (ref 0–0.2)
PLATELET # BLD AUTO: 304 10*3/MM3 (ref 140–450)
PMV BLD AUTO: 9.1 FL (ref 6–12)
POTASSIUM SERPL-SCNC: 4.6 MMOL/L (ref 3.5–5.2)
RBC # BLD AUTO: 4.51 10*6/MM3 (ref 4.14–5.8)
SODIUM SERPL-SCNC: 142 MMOL/L (ref 136–145)
WBC NRBC COR # BLD: 6.77 10*3/MM3 (ref 3.4–10.8)

## 2022-04-21 PROCEDURE — 96376 TX/PRO/DX INJ SAME DRUG ADON: CPT

## 2022-04-21 PROCEDURE — 85025 COMPLETE CBC W/AUTO DIFF WBC: CPT | Performed by: PHYSICIAN ASSISTANT

## 2022-04-21 PROCEDURE — 25010000002 LEVETIRACETAM IN NACL 0.82% 500 MG/100ML SOLUTION: Performed by: PHYSICIAN ASSISTANT

## 2022-04-21 PROCEDURE — 99204 OFFICE O/P NEW MOD 45 MIN: CPT | Performed by: PSYCHIATRY & NEUROLOGY

## 2022-04-21 PROCEDURE — 80048 BASIC METABOLIC PNL TOTAL CA: CPT | Performed by: PHYSICIAN ASSISTANT

## 2022-04-21 PROCEDURE — 99225 PR SBSQ OBSERVATION CARE/DAY 25 MINUTES: CPT | Performed by: INTERNAL MEDICINE

## 2022-04-21 PROCEDURE — G0378 HOSPITAL OBSERVATION PER HR: HCPCS

## 2022-04-21 RX ORDER — HYDROXYZINE HYDROCHLORIDE 25 MG/1
25 TABLET, FILM COATED ORAL EVERY 6 HOURS
Status: DISCONTINUED | OUTPATIENT
Start: 2022-04-21 | End: 2022-04-22

## 2022-04-21 RX ORDER — ESCITALOPRAM OXALATE 10 MG/1
10 TABLET ORAL DAILY
Qty: 30 TABLET | Refills: 0 | Status: SHIPPED | OUTPATIENT
Start: 2022-04-21

## 2022-04-21 RX ORDER — HYDROXYZINE HYDROCHLORIDE 25 MG/1
25 TABLET, FILM COATED ORAL 3 TIMES DAILY PRN
Qty: 60 TABLET | Refills: 0 | Status: SHIPPED | OUTPATIENT
Start: 2022-04-21

## 2022-04-21 RX ORDER — ESCITALOPRAM OXALATE 10 MG/1
10 TABLET ORAL DAILY
Status: DISCONTINUED | OUTPATIENT
Start: 2022-04-21 | End: 2022-04-23 | Stop reason: HOSPADM

## 2022-04-21 RX ORDER — HYDROXYZINE HYDROCHLORIDE 25 MG/1
25 TABLET, FILM COATED ORAL 3 TIMES DAILY PRN
Status: DISCONTINUED | OUTPATIENT
Start: 2022-04-21 | End: 2022-04-22

## 2022-04-21 RX ADMIN — Medication 10 ML: at 20:21

## 2022-04-21 RX ADMIN — HYDROXYZINE HYDROCHLORIDE 25 MG: 25 TABLET, FILM COATED ORAL at 11:52

## 2022-04-21 RX ADMIN — ESCITALOPRAM OXALATE 10 MG: 10 TABLET ORAL at 11:11

## 2022-04-21 RX ADMIN — LEVETIRACETAM 500 MG: 5 INJECTION INTRAVENOUS at 20:20

## 2022-04-21 RX ADMIN — HYDROXYZINE HYDROCHLORIDE 25 MG: 25 TABLET, FILM COATED ORAL at 18:32

## 2022-04-21 NOTE — PLAN OF CARE
Goal Outcome Evaluation:  Plan of Care Reviewed With: patient        Progress: improving     Patient is alert and oriented. Seizure precautions maintained. Only 2 reported seizure episodes noted. Voids spontaneously per urinal. V/S remain stable. Will continue to monitor.

## 2022-04-21 NOTE — PLAN OF CARE
"Goal Outcome Evaluation:           Progress: improving  Outcome Evaluation: Pt was being discharged earlier today when he had a seizure-like episode in the elevator.  Pt was taken back to room and MD and Neuro was paged.  Meds were given per order.  Pt. remained coherent through whole episode and was able to answer questions immediately following.  After meds were given, pt has only reported one \"episode\" and says that he \"feels much better\".   Will continue to monitor.  Raoul Cartagena RN  "

## 2022-04-21 NOTE — CONSULTS
"Neurology    Referring provider:   No referring provider defined for this encounter.    Reason for Consultation: Seizure    Chief complaint: Seizure    History of present illness: Pleasant 27-year-old man seen for Dr. Barnes for evaluation of seizures.    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    He has been working 12 to 16 hours a day 6 or 7 days a week and lives in [place] and is on the road 90% of the time.    The uses occasional recreational drugs including cocaine and marijuana.  His drug screen on admission was negative.    The he does not sleep particularly well and is mostly isolated.    He lives in [place].        Review of Systems: He says he uses cocaine rarely.    Does not drink heavily.    He says that he is mildly depressed.    Other systems reviewed and are negative.        Home meds:   No medications prior to admission.       History  History reviewed. No pertinent past medical history.,   Past Surgical History:   Procedure Laterality Date   • APPENDECTOMY     • LASIK Right    ,   Family History   Problem Relation Age of Onset   • No Known Problems Mother    • Diabetes Father    ,   Social History     Tobacco Use   • Smoking status: Never Smoker   • Smokeless tobacco: Current User     Types: Chew   Vaping Use   • Vaping Use: Every day   • Substances: Nicotine, THC, Flavoring   Substance Use Topics   • Alcohol use: Yes     Alcohol/week: 7.0 standard drinks     Types: 7 Cans of beer per week   • Drug use: Yes     Frequency: 1.0 times per week     Types: Cocaine(coke), Marijuana    and Allergies:  Patient has no known allergies.,    Vital Signs   Blood pressure 110/55, pulse 71, temperature 98 °F (36.7 °C), temperature source Oral, resp. rate 18, height 170.2 cm (67\"), weight 85.7 kg (189 lb), SpO2 97 %.  Body mass index is 29.6 kg/m².    Physical Exam:   General: Pleasant white male in no distress          "     Head: No trauma              Neck: Supple with no bruit              Resp: Normal breath sounds              Cor: Regular rhythm              Extremities: No edema              Skin: Normal dry              Neuro: The patient had a witnessed convulsion.    He told me that he was aware of it coming on.  He had rhythmic jerking of both arms on his right leg.  He rolled his eyes back and has had and held his breath briefly.    Afterwards he was able to converse and can tell me that he is perfectly aware of everything going on during the spell.    Otherwise mentally he is awake alert and oriented to person place and time with normal memory attention and concentration.    Speech is articulate with no word finding problems.    Coordination is normal on finger-nose and fine finger movements bilaterally.    Cranial nerves show benign fundi equal pupils full eye movements.  Facial movement sensation are normal.    Palate elevates normally tongue protrudes normally.    Reflexes are 3+ and equal bilaterally.    Motor testing shows normal power tone in all muscle groups.    Sensory testing is normal.        Results Review: EEG the showed seizures which were nonepileptic that occurred several times during the study.    MRI brain was personally reviewed and shows no abnormality.        Labs:  Lab Results (last 72 hours)     Procedure Component Value Units Date/Time    Basic Metabolic Panel [[id number]]  (Abnormal) Collected: 04/21/22 0734    Specimen: Blood Updated: 04/21/22 0913     Glucose 100 mg/dL      BUN 12 mg/dL      Creatinine 0.95 mg/dL      Sodium 142 mmol/L      Potassium 4.6 mmol/L      Chloride 109 mmol/L      CO2 25.0 mmol/L      Calcium 9.2 mg/dL      BUN/Creatinine Ratio 12.6     Anion Gap 8.0 mmol/L      eGFR 113.2 mL/min/1.73      Comment: National Kidney Foundation and American Society of Nephrology (ASN) Task Force recommended calculation based on the Chronic Kidney Disease Epidemiology Collaboration  (CKD-EPI) equation refit without adjustment for race.       Narrative:      GFR Normal >60  Chronic Kidney Disease <60  Kidney Failure <15      CBC & Differential [[id number]]  (Normal) Collected: 04/21/22 0734    Specimen: Blood Updated: 04/21/22 0815    Narrative:      The following orders were created for panel order CBC & Differential.  Procedure                               Abnormality         Status                     ---------                               -----------         ------                     CBC Auto Differential[[id number]]        Normal              Final result                 Please view results for these tests on the individual orders.    CBC Auto Differential [[id number]]  (Normal) Collected: 04/21/22 0734    Specimen: Blood Updated: 04/21/22 0815     WBC 6.77 10*3/mm3      RBC 4.51 10*6/mm3      Hemoglobin 14.2 g/dL      Hematocrit 42.7 %      MCV 94.7 fL      MCH 31.5 pg      MCHC 33.3 g/dL      RDW 12.8 %      RDW-SD 43.9 fl      MPV 9.1 fL      Platelets 304 10*3/mm3      Neutrophil % 49.0 %      Lymphocyte % 37.1 %      Monocyte % 11.1 %      Eosinophil % 2.1 %      Basophil % 0.3 %      Immature Grans % 0.4 %      Neutrophils, Absolute 3.32 10*3/mm3      Lymphocytes, Absolute 2.51 10*3/mm3      Monocytes, Absolute 0.75 10*3/mm3      Eosinophils, Absolute 0.14 10*3/mm3      Basophils, Absolute 0.02 10*3/mm3      Immature Grans, Absolute 0.03 10*3/mm3      nRBC 0.0 /100 WBC     CK [[id number]]  (Normal) Collected: 04/20/22 0352    Specimen: Blood Updated: 04/20/22 1028     Creatine Kinase 97 U/L     Basic Metabolic Panel [[id number]]  (Abnormal) Collected: 04/20/22 0352    Specimen: Blood Updated: 04/20/22 0437     Glucose 95 mg/dL      BUN 13 mg/dL      Creatinine 0.92 mg/dL      Sodium 141 mmol/L      Potassium 3.7 mmol/L      Chloride 108 mmol/L      CO2 26.0 mmol/L      Calcium 8.9 mg/dL      BUN/Creatinine Ratio 14.1     Anion Gap 7.0 mmol/L      eGFR 117.7 mL/min/1.73      Comment:  National Kidney Foundation and American Society of Nephrology (ASN) Task Force recommended calculation based on the Chronic Kidney Disease Epidemiology Collaboration (CKD-EPI) equation refit without adjustment for race.       Narrative:      GFR Normal >60  Chronic Kidney Disease <60  Kidney Failure <15      CBC & Differential [[id number]]  (Abnormal) Collected: 04/20/22 0352    Specimen: Blood Updated: 04/20/22 0420    Narrative:      The following orders were created for panel order CBC & Differential.  Procedure                               Abnormality         Status                     ---------                               -----------         ------                     CBC Auto Differential[[id number]]        Abnormal            Final result                 Please view results for these tests on the individual orders.    CBC Auto Differential [[id number]]  (Abnormal) Collected: 04/20/22 0352    Specimen: Blood Updated: 04/20/22 0420     WBC 9.07 10*3/mm3      RBC 4.25 10*6/mm3      Hemoglobin 13.2 g/dL      Hematocrit 39.6 %      MCV 93.2 fL      MCH 31.1 pg      MCHC 33.3 g/dL      RDW 12.7 %      RDW-SD 43.5 fl      MPV 9.4 fL      Platelets 288 10*3/mm3      Neutrophil % 52.4 %      Lymphocyte % 35.0 %      Monocyte % 10.1 %      Eosinophil % 1.7 %      Basophil % 0.4 %      Immature Grans % 0.4 %      Neutrophils, Absolute 4.75 10*3/mm3      Lymphocytes, Absolute 3.17 10*3/mm3      Monocytes, Absolute 0.92 10*3/mm3      Eosinophils, Absolute 0.15 10*3/mm3      Basophils, Absolute 0.04 10*3/mm3      Immature Grans, Absolute 0.04 10*3/mm3      nRBC 0.0 /100 WBC     COVID PRE-OP / PRE-PROCEDURE SCREENING ORDER (NO ISOLATION) - Swab, Nasopharynx [[id number]]  (Normal) Collected: 04/19/22 1837    Specimen: Swab from Nasopharynx Updated: 04/19/22 1909    Narrative:      The following orders were created for panel order COVID PRE-OP / PRE-PROCEDURE SCREENING ORDER (NO ISOLATION) - Swab, Nasopharynx.  Procedure                                Abnormality         Status                     ---------                               -----------         ------                     COVID-19 and FLU A/B PCR...[[id number]]  Normal              Final result                 Please view results for these tests on the individual orders.    COVID-19 and FLU A/B PCR - Swab, Nasopharynx [[id number]]  (Normal) Collected: 04/19/22 1837    Specimen: Swab from Nasopharynx Updated: 04/19/22 1909     COVID19 Not Detected     Influenza A PCR Not Detected     Influenza B PCR Not Detected    Narrative:      Fact sheet for providers: https://www.fda.gov/media/736571/download    Fact sheet for patients: https://www.fda.gov/media/114020/download    Test performed by PCR.    [place] Draw [[id number]] Collected: 04/19/22 1444    Specimen: Blood Updated: 04/19/22 1847    Narrative:      The following orders were created for panel order [place] Draw.  Procedure                               Abnormality         Status                     ---------                               -----------         ------                     Green Top (Gel)[[id number]]                                  Final result               Lavender Top[[id number]]                                     Final result               Gold Top - SST[[id number]]                                   Final result               Browning Top[[id number]]                                         Final result               Light Blue Top[[id number]]                                   Final result                 Please view results for these tests on the individual orders.    [place] Top [[id number]] Collected: 04/19/22 1444    Specimen: Blood Updated: 04/19/22 1847     Extra Tube Hold for add-ons.     Comment: Auto resulted.       Ethanol [[id number]]  (Normal) Collected: 04/19/22 1746    Specimen: Blood Updated: 04/19/22 1815     Ethanol <10 mg/dL     Narrative:      Elevated lactic acid concentration and lactate dehydrogenase(LD)  activity may falsely elevate enzymatically determined ethanol levels. Not for legal purposes.     Urine Drug Screen - Urine, Clean Catch [[id number]]  (Normal) Collected: 04/19/22 1626    Specimen: Urine, Clean Catch Updated: 04/19/22 1706     THC, Screen, Urine Negative     Phencyclidine (PCP), Urine Negative     Cocaine Screen, Urine Negative     Methamphetamine, Ur Negative     Opiate Screen Negative     Amphetamine Screen, Urine Negative     Benzodiazepine Screen, Urine Negative     Tricyclic Antidepressants Screen Negative     Methadone Screen, Urine Negative     Barbiturates Screen, Urine Negative     Oxycodone Screen, Urine Negative     Propoxyphene Screen Negative     Buprenorphine, Screen, Urine Negative    Narrative:      Cutoff For Drugs Screened:    Amphetamines               500 ng/ml  Barbiturates               200 ng/ml  Benzodiazepines            150 ng/ml  Cocaine                    150 ng/ml  Methadone                  200 ng/ml  Opiates                    100 ng/ml  Phencyclidine               25 ng/ml  THC                            50 ng/ml  Methamphetamine            500 ng/ml  Tricyclic Antidepressants  300 ng/ml  Oxycodone                  100 ng/ml  Propoxyphene               300 ng/ml  Buprenorphine               10 ng/ml    The normal value for all drugs tested is negative. This report includes unconfirmed screening results, with the cutoff values listed, to be used for medical treatment purposes only.  Unconfirmed results must not be used for non-medical purposes such as employment or legal testing.  Clinical consideration should be applied to any drug of abuse test, particularly when unconfirmed results are used.      Urinalysis With Microscopic If Indicated (No Culture) - Urine, Clean Catch [[id number]]  (Abnormal) Collected: 04/19/22 1626    Specimen: Urine, Clean Catch Updated: 04/19/22 1659     Color, UA Yellow     Appearance, UA Clear     pH, UA 6.5     Specific [place], UA 1.010      Glucose, UA Negative     Ketones, UA Trace     Bilirubin, UA Negative     Blood, UA Negative     Protein, UA Negative     Leuk Esterase, UA Negative     Nitrite, UA Negative     Urobilinogen, UA 0.2 E.U./dL    Narrative:      Urine microscopic not indicated.    Magnesium [[id number]]  (Normal) Collected: 04/19/22 1444    Specimen: Blood Updated: 04/19/22 1551     Magnesium 2.1 mg/dL     Green Top (Gel) [[id number]] Collected: 04/19/22 1444    Specimen: Blood Updated: 04/19/22 1548     Extra Tube Hold for add-ons.     Comment: Auto resulted.       Lavender Top [[id number]] Collected: 04/19/22 1444    Specimen: Blood Updated: 04/19/22 1548     Extra Tube hold for add-on     Comment: Auto resulted       Gold Top - SST [[id number]] Collected: 04/19/22 1444    Specimen: Blood Updated: 04/19/22 1548     Extra Tube Hold for add-ons.     Comment: Auto resulted.       Light Blue Top [[id number]] Collected: 04/19/22 1444    Specimen: Blood Updated: 04/19/22 1548     Extra Tube hold for add-on     Comment: Auto resulted       Comprehensive Metabolic Panel [[id number]] Collected: 04/19/22 1444    Specimen: Blood Updated: 04/19/22 1528     Glucose 90 mg/dL      BUN 14 mg/dL      Creatinine 0.97 mg/dL      Sodium 141 mmol/L      Potassium 3.6 mmol/L      Chloride 104 mmol/L      CO2 24.0 mmol/L      Calcium 9.2 mg/dL      Total Protein 6.9 g/dL      Albumin 4.60 g/dL      ALT (SGPT) 17 U/L      AST (SGOT) 19 U/L      Alkaline Phosphatase 95 U/L      Total Bilirubin 0.3 mg/dL      Globulin 2.3 gm/dL      Comment: Calculated Result        A/G Ratio 2.0 g/dL      BUN/Creatinine Ratio 14.4     Anion Gap 13.0 mmol/L      eGFR 110.4 mL/min/1.73      Comment: National Kidney Foundation and American Society of Nephrology (ASN) Task Force recommended calculation based on the Chronic Kidney Disease Epidemiology Collaboration (CKD-EPI) equation refit without adjustment for race.       Narrative:      GFR Normal >60  Chronic Kidney Disease  <60  Kidney Failure <15      CBC & Differential [[id number]]  (Normal) Collected: 04/19/22 1444    Specimen: Blood Updated: 04/19/22 1515    Narrative:      The following orders were created for panel order CBC & Differential.  Procedure                               Abnormality         Status                     ---------                               -----------         ------                     CBC Auto Differential[[id number]]        Normal              Final result                 Please view results for these tests on the individual orders.    CBC Auto Differential [[id number]]  (Normal) Collected: 04/19/22 1444    Specimen: Blood Updated: 04/19/22 1515     WBC 8.83 10*3/mm3      RBC 4.30 10*6/mm3      Hemoglobin 13.6 g/dL      Hematocrit 41.6 %      MCV 96.7 fL      MCH 31.6 pg      MCHC 32.7 g/dL      RDW 12.5 %      RDW-SD 44.2 fl      MPV 9.3 fL      Platelets 283 10*3/mm3      Neutrophil % 65.8 %      Lymphocyte % 24.3 %      Monocyte % 8.3 %      Eosinophil % 1.0 %      Basophil % 0.3 %      Immature Grans % 0.3 %      Neutrophils, Absolute 5.80 10*3/mm3      Lymphocytes, Absolute 2.15 10*3/mm3      Monocytes, Absolute 0.73 10*3/mm3      Eosinophils, Absolute 0.09 10*3/mm3      Basophils, Absolute 0.03 10*3/mm3      Immature Grans, Absolute 0.03 10*3/mm3      nRBC 0.0 /100 WBC           Rads:  Imaging Results (Last 72 Hours)     Procedure Component Value Units Date/Time    MRI Brain With & Without Contrast [[id number]] Collected: 04/19/22 1724     Updated: 04/19/22 1732    Narrative:      DATE OF EXAM: 4/19/2022 5:18 PM     PROCEDURE: MRI BRAIN W WO CONTRAST-     INDICATIONS: headache, dizziness, seizure vs sycope     COMPARISON: No comparisons available.     TECHNIQUE: Multiplanar multisequence images of the brain were performed  prior to and after the uneventful intravenous contrast administration of  15 MultiHance.     FINDINGS:   There are no foci of restricted diffusion. There are no  intra-axial  signal abnormalities. Specifically, there is no vasogenic edema,  cortical signal abnormality, or abnormal T2 signal or atrophy of the  mesial temporal region. There are no intra-axial or extra-axial contrast  enhancing abnormalities. No heterotopic gray matter or developmental  anomaly is demonstrated. The CSF spaces/ventricles are normal. There are  no abnormal extra-axial fluid collections or masses. Cerebellar tonsils  are in normal position. No sellar/suprasellar lesion is demonstrated.  The major intracranial flow voids are present. No skull lesion is  demonstrated. No orbital abnormality is demonstrated. There is no fluid  in the paranasal sinuses/middle ear cavities        Impression:      Negative exam. No findings to account for seizure     This report was finalized on 4/19/2022 5:29 PM by Wilmer Henriquez.       CT Head Without Contrast [[id number]] Collected: 04/19/22 1558     Updated: 04/19/22 1649    Narrative:      CT HEAD WO CONTRAST-     Date of Exam: 4/19/2022 3:48 PM     Indication: headache, seizure.     Comparison Exams: None available.     Technique: Multiple axial images were obtained from the skull base to  the vertex without the administration of IV contrast. The axial data was  used to generate reformatted images in the coronal and sagittal planes.  Automated exposure control and iterative reconstruction methods were  used.     FINDINGS:  There is no acute infarct or hemorrhage.   No abnormal extra-axial fluid  collections are seen.   There is no mass effect or hydrocephalus.     There is no evidence of skull fracture.   Visualized paranasal sinuses  and mastoid air cells are clear.      The globes and orbits are within normal limits.       Impression:         1. No acute intracranial abnormality.     This report was finalized on 4/19/2022 4:46 PM by Maikel Franks MD.       CT Cervical Spine Without Contrast [[id number]] Collected: 04/19/22 1603     Updated: 04/19/22 1608     Narrative:      DATE OF EXAM: 4/19/2022 3:48 PM     PROCEDURE: CT CERVICAL SPINE WO CONTRAST-     INDICATIONS: neck pain, new onset seizure     COMPARISON: No comparisons available.     TECHNIQUE: Routine transaxial slices were obtained through the cervical  spine without the administration of intravenous contrast. Reconstructed  coronal and sagittal images were also obtained. Automated exposure  control and iterative construction methods were used.      The radiation dose reduction device was turned on for each scan per the  ALARA (As Low as Reasonably Achievable) protocol.     FINDINGS:  No evidence of fracture or subluxation. No significant degenerative  changes. Prevertebral soft tissues normal        Impression:      Negative     This report was finalized on 4/19/2022 4:05 PM by Wilmer Henriquez.               Assessment: PNES (psychogenic nonepileptic seizures.)    Mixed anxiety depression       Plan:    Patient is being discharged today.  His father is picking him up and driving him back to [place].    Atarax 25 mg now and 25 mg as needed for anxiety.    Lexapro 10 mg daily.    Outpatient psychiatric counseling in [place].    Patient is not to operate a motor vehicle or work at height for least of the next 3 months until his stressors are addressed.    Comment:   Difficult situation and young man who is making high salary in a dangerous job and the stress of all the above is making a dangerous job more dangerous.    Suggested him that he probably needs to seek a different line of work and spend most of his working hours on the ground.    I discussed the patients findings and my recommendations with patient, nursing staff and primary care team      Josh Avila MD  04/21/22  11:12 EDT

## 2022-04-22 LAB
ANION GAP SERPL CALCULATED.3IONS-SCNC: 11 MMOL/L (ref 5–15)
BASOPHILS # BLD AUTO: 0.03 10*3/MM3 (ref 0–0.2)
BASOPHILS NFR BLD AUTO: 0.5 % (ref 0–1.5)
BUN SERPL-MCNC: 19 MG/DL (ref 6–20)
BUN/CREAT SERPL: 19 (ref 7–25)
CALCIUM SPEC-SCNC: 9.5 MG/DL (ref 8.6–10.5)
CHLORIDE SERPL-SCNC: 105 MMOL/L (ref 98–107)
CO2 SERPL-SCNC: 24 MMOL/L (ref 22–29)
CREAT SERPL-MCNC: 1 MG/DL (ref 0.76–1.27)
DEPRECATED RDW RBC AUTO: 44 FL (ref 37–54)
EGFRCR SERPLBLD CKD-EPI 2021: 106.5 ML/MIN/1.73
EOSINOPHIL # BLD AUTO: 0.16 10*3/MM3 (ref 0–0.4)
EOSINOPHIL NFR BLD AUTO: 2.4 % (ref 0.3–6.2)
ERYTHROCYTE [DISTWIDTH] IN BLOOD BY AUTOMATED COUNT: 12.5 % (ref 12.3–15.4)
GLUCOSE SERPL-MCNC: 94 MG/DL (ref 65–99)
HCT VFR BLD AUTO: 45 % (ref 37.5–51)
HGB BLD-MCNC: 14.7 G/DL (ref 13–17.7)
IMM GRANULOCYTES # BLD AUTO: 0.03 10*3/MM3 (ref 0–0.05)
IMM GRANULOCYTES NFR BLD AUTO: 0.5 % (ref 0–0.5)
LYMPHOCYTES # BLD AUTO: 2.53 10*3/MM3 (ref 0.7–3.1)
LYMPHOCYTES NFR BLD AUTO: 38.5 % (ref 19.6–45.3)
MCH RBC QN AUTO: 31.5 PG (ref 26.6–33)
MCHC RBC AUTO-ENTMCNC: 32.7 G/DL (ref 31.5–35.7)
MCV RBC AUTO: 96.4 FL (ref 79–97)
MONOCYTES # BLD AUTO: 0.72 10*3/MM3 (ref 0.1–0.9)
MONOCYTES NFR BLD AUTO: 11 % (ref 5–12)
NEUTROPHILS NFR BLD AUTO: 3.1 10*3/MM3 (ref 1.7–7)
NEUTROPHILS NFR BLD AUTO: 47.1 % (ref 42.7–76)
NRBC BLD AUTO-RTO: 0 /100 WBC (ref 0–0.2)
PLATELET # BLD AUTO: 312 10*3/MM3 (ref 140–450)
PMV BLD AUTO: 9.3 FL (ref 6–12)
POTASSIUM SERPL-SCNC: 4 MMOL/L (ref 3.5–5.2)
RBC # BLD AUTO: 4.67 10*6/MM3 (ref 4.14–5.8)
SODIUM SERPL-SCNC: 140 MMOL/L (ref 136–145)
WBC NRBC COR # BLD: 6.57 10*3/MM3 (ref 3.4–10.8)

## 2022-04-22 PROCEDURE — 85025 COMPLETE CBC W/AUTO DIFF WBC: CPT | Performed by: PHYSICIAN ASSISTANT

## 2022-04-22 PROCEDURE — G0378 HOSPITAL OBSERVATION PER HR: HCPCS

## 2022-04-22 PROCEDURE — 80048 BASIC METABOLIC PNL TOTAL CA: CPT | Performed by: PHYSICIAN ASSISTANT

## 2022-04-22 PROCEDURE — 25010000002 LORAZEPAM PER 2 MG: Performed by: INTERNAL MEDICINE

## 2022-04-22 PROCEDURE — 25010000002 LORAZEPAM PER 2 MG: Performed by: PSYCHIATRY & NEUROLOGY

## 2022-04-22 PROCEDURE — 99212 OFFICE O/P EST SF 10 MIN: CPT | Performed by: PSYCHIATRY & NEUROLOGY

## 2022-04-22 PROCEDURE — 96376 TX/PRO/DX INJ SAME DRUG ADON: CPT

## 2022-04-22 PROCEDURE — 99225 PR SBSQ OBSERVATION CARE/DAY 25 MINUTES: CPT | Performed by: INTERNAL MEDICINE

## 2022-04-22 RX ORDER — LORAZEPAM 2 MG/ML
1 INJECTION INTRAMUSCULAR EVERY 4 HOURS PRN
Status: DISCONTINUED | OUTPATIENT
Start: 2022-04-22 | End: 2022-04-22

## 2022-04-22 RX ORDER — LORAZEPAM 2 MG/ML
2 INJECTION INTRAMUSCULAR ONCE
Status: COMPLETED | OUTPATIENT
Start: 2022-04-22 | End: 2022-04-22

## 2022-04-22 RX ORDER — LORAZEPAM 0.5 MG/1
0.5 TABLET ORAL EVERY 6 HOURS
Status: DISCONTINUED | OUTPATIENT
Start: 2022-04-22 | End: 2022-04-23 | Stop reason: HOSPADM

## 2022-04-22 RX ORDER — HYDROXYZINE HYDROCHLORIDE 25 MG/1
25 TABLET, FILM COATED ORAL 3 TIMES DAILY PRN
Status: DISCONTINUED | OUTPATIENT
Start: 2022-04-22 | End: 2022-04-22

## 2022-04-22 RX ADMIN — LORAZEPAM 2 MG: 2 INJECTION INTRAMUSCULAR; INTRAVENOUS at 22:35

## 2022-04-22 RX ADMIN — HYDROXYZINE HYDROCHLORIDE 25 MG: 25 TABLET, FILM COATED ORAL at 06:05

## 2022-04-22 RX ADMIN — Medication 10 ML: at 21:44

## 2022-04-22 RX ADMIN — HYDROXYZINE HYDROCHLORIDE 25 MG: 25 TABLET, FILM COATED ORAL at 11:17

## 2022-04-22 RX ADMIN — HYDROXYZINE HYDROCHLORIDE 25 MG: 25 TABLET, FILM COATED ORAL at 00:07

## 2022-04-22 RX ADMIN — LORAZEPAM 0.5 MG: 0.5 TABLET ORAL at 17:34

## 2022-04-22 RX ADMIN — ACETAMINOPHEN 650 MG: 325 TABLET ORAL at 11:17

## 2022-04-22 RX ADMIN — ESCITALOPRAM OXALATE 10 MG: 10 TABLET ORAL at 08:18

## 2022-04-22 RX ADMIN — LORAZEPAM 2 MG: 2 INJECTION INTRAMUSCULAR; INTRAVENOUS at 11:59

## 2022-04-22 RX ADMIN — LORAZEPAM 0.5 MG: 0.5 TABLET ORAL at 23:34

## 2022-04-22 RX ADMIN — Medication 5 MG: at 21:45

## 2022-04-22 NOTE — PROGRESS NOTES
Neurology       Patient Care Team:  Provider, No Known as PCP - General    Chief complaint: PNES    History: Patient had multiple seizures on his attempt to leave the hospital and they have continued while in the hospital.    's father is here and I spoke to him at length about the diagnosis and the problem with stress-induced seizures.    Patient had 2 mg of Ativan and is sleeping soundly and barely arousable  History reviewed. No pertinent past medical history.    Vital Signs   Vitals:    04/22/22 0910 04/22/22 0939 04/22/22 0940 04/22/22 1138   BP:   123/67 132/73   BP Location:   Right arm Left arm   Patient Position:   Lying Lying   Pulse:  (!) 128 78 78   Resp:    18   Temp:    98 °F (36.7 °C)   TempSrc:    Oral   SpO2: 98% 98% 97% 98%   Weight:       Height:           Physical Exam:   General: This              Neuro: Wakes to follow back to sleep    Results Review:  No new results  Results from last 7 days   Lab Units 04/22/22  0751   WBC 10*3/mm3 6.57   HEMOGLOBIN g/dL 14.7   HEMATOCRIT % 45.0   PLATELETS 10*3/mm3 312     Results from last 7 days   Lab Units 04/22/22  0751 04/21/22  0734 04/20/22  0352 04/19/22  1444   SODIUM mmol/L 140 142 141 141   POTASSIUM mmol/L 4.0 4.6 3.7 3.6   CHLORIDE mmol/L 105 109* 108* 104   CO2 mmol/L 24.0 25.0 26.0 24.0   BUN mg/dL 19 12 13 14   CREATININE mg/dL 1.00 0.95 0.92 0.97   CALCIUM mg/dL 9.5 9.2 8.9 9.2   BILIRUBIN mg/dL  --   --   --  0.3   ALK PHOS U/L  --   --   --  95   ALT (SGPT) U/L  --   --   --  17   AST (SGOT) U/L  --   --   --  19   GLUCOSE mg/dL 94 100* 95 90       Imaging Results (Last 24 Hours)     ** No results found for the last 24 hours. **          Assessment:  Psychogenic nonepileptic seizures    Plan:  Continue Ativan 0.5mg every 6 hours.    Patient cannot leave in the spells of settle down.  Hopefully in the next day or so.    Comment:  Will talk to the dad who plans to seek out psychiatric follow-up in [place] where the patient resides.         I  discussed the patients findings and my recommendations with family and primary care team    Josh Avila MD  04/22/22  14:26 EDT

## 2022-04-22 NOTE — PLAN OF CARE
Goal Outcome Evaluation:     4 episodes of repeated jerking with eyes rolling back into head. Pt able to respond verbally during jerking spells with no loss of control of bowel/bladder or consciousness. Pt able to remember episodes and inform staff after occurrence. Jerking spells resolved with sleep, with pt only experiencing them while awake. VSS on RA.

## 2022-04-22 NOTE — CASE MANAGEMENT/SOCIAL WORK
Continued Stay Note  [facility]     Patient Name: Juan Feliciano  MRN: [id number]  Today's Date: 4/22/2022    Admit Date: 4/19/2022     Discharge Plan     Row Name 04/22/22 1545       Plan    Plan Home with able to be discharged.    Plan Comments Patient anticipating to home in [place] with father when able to be discharged. I did place him meds to bed so he can fill any new prescriptions prior to leaving here.    Final Discharge Disposition Code 01 - home or self-care               Discharge Codes    No documentation.               Expected Discharge Date and Time     Expected Discharge Date Expected Discharge Time    Apr 22, 2022             Richelle Barton RN

## 2022-04-22 NOTE — PROGRESS NOTES
[facility] Medicine Services  PROGRESS NOTE    Patient Name: Juan Feliciano  : 1995  MRN: [id number]    Date of Admission: 2022  Primary Care Physician: Provider, No Known    Subjective   Subjective     CC: f/u spells    HPI: Discharge cancelled yesterday as patient had another spell when being transported to exit. Had several episodes overnight and again this am. Says his neck is sore and asking for heating pad.     ROS:  Gen- No fevers, chills  CV- No chest pain, palpitations  Resp- No cough, dyspnea  GI- No N/V/D, abd pain     Objective   Objective     Vital Signs:   Temp:  [97.4 °F (36.3 °C)-98.7 °F (37.1 °C)] 97.8 °F (36.6 °C)  Heart Rate:  [] 78  Resp:  [18] 18  BP: (119-141)/(64-78) 123/67  Flow (L/min):  [2] 2     Physical Exam:  Constitutional: No acute distress, awake, alert  HENT: NCAT, mucous membranes moist  Respiratory: Clear to auscultation bilaterally, respiratory effort normal   Cardiovascular: RRR, no murmurs, rubs, or gallops  Gastrointestinal: Positive bowel sounds, soft, nontender, nondistended  Musculoskeletal: No bilateral ankle edema  Psychiatric: Appropriate affect, cooperative  Neurologic: Oriented x 3, strength symmetric in all extremities, Cranial Nerves grossly intact to confrontation, speech clear  Skin: No rashes    Results Reviewed:  LAB RESULTS:      Lab 22  0751 22  0734 22  0352 22  1444   WBC 6.57 6.77 9.07 8.83   HEMOGLOBIN 14.7 14.2 13.2 13.6   HEMATOCRIT 45.0 42.7 39.6 41.6   PLATELETS 312 304 288 283   NEUTROS ABS 3.10 3.32 4.75 5.80   IMMATURE GRANS (ABS) 0.03 0.03 0.04 0.03   LYMPHS ABS 2.53 2.51 3.17* 2.15   MONOS ABS 0.72 0.75 0.92* 0.73   EOS ABS 0.16 0.14 0.15 0.09   MCV 96.4 94.7 93.2 96.7         Lab 22  0751 22  0734 22  0352 22  1444   SODIUM 140 142 141 141   POTASSIUM 4.0 4.6 3.7 3.6   CHLORIDE 105 109* 108* 104   CO2 24.0 25.0 26.0 24.0   ANION GAP 11.0 8.0  7.0 13.0   BUN 19 12 13 14   CREATININE 1.00 0.95 0.92 0.97   EGFR 106.5 113.2 117.7 110.4   GLUCOSE 94 100* 95 90   CALCIUM 9.5 9.2 8.9 9.2   MAGNESIUM  --   --   --  2.1         Lab 04/19/22  1444   TOTAL PROTEIN 6.9   ALBUMIN 4.60   GLOBULIN 2.3   ALT (SGPT) 17   AST (SGOT) 19   BILIRUBIN 0.3   ALK PHOS 95                     Brief Urine Lab Results  (Last result in the past 365 days)      Color   Clarity   Blood   Leuk Est   Nitrite   Protein   CREAT   Urine HCG        04/19/22 1626 Yellow   Clear   Negative   Negative   Negative   Negative                 Microbiology Results Abnormal     Procedure Component Value - Date/Time    COVID PRE-OP / PRE-PROCEDURE SCREENING ORDER (NO ISOLATION) - Swab, Nasopharynx [[id number]]  (Normal) Collected: 04/19/22 1837    Lab Status: Final result Specimen: Swab from Nasopharynx Updated: 04/19/22 1909    Narrative:      The following orders were created for panel order COVID PRE-OP / PRE-PROCEDURE SCREENING ORDER (NO ISOLATION) - Swab, Nasopharynx.  Procedure                               Abnormality         Status                     ---------                               -----------         ------                     COVID-19 and FLU A/B PCR...[[id number]]  Normal              Final result                 Please view results for these tests on the individual orders.    COVID-19 and FLU A/B PCR - Swab, Nasopharynx [[id number]]  (Normal) Collected: 04/19/22 1837    Lab Status: Final result Specimen: Swab from Nasopharynx Updated: 04/19/22 1909     COVID19 Not Detected     Influenza A PCR Not Detected     Influenza B PCR Not Detected    Narrative:      Fact sheet for providers: https://www.fda.gov/media/699712/download    Fact sheet for patients: https://www.fda.gov/media/902840/download    Test performed by PCR.          No radiology results from the last 24 hrs        I have reviewed the medications:  Scheduled Meds:escitalopram, 10 mg, Oral, Daily  LORazepam, 0.5 mg, Oral,  Q6H  sodium chloride, 10 mL, Intravenous, Q12H      Continuous Infusions:   PRN Meds:.•  acetaminophen  •  hydrOXYzine  •  melatonin  •  ondansetron **OR** ondansetron  •  [COMPLETED] Insert peripheral IV **AND** sodium chloride    Assessment/Plan   Assessment & Plan     Active Hospital Problems   No active problems to display.      Resolved Hospital Problems    Diagnosis Date Resolved POA   • Seizure-like activity (HCC) [R56.9] 04/20/2022 Yes        Brief Hospital Course to date:  Juan Feliciano is a 27 y.o. male with recent stressors who has been working 7 days a week and works outside presenting with convulsive type episodes. He underwent EEG confirming no epileptic activity during spells.     Non epileptic spells  -Patient has undergone extensive evaluation. Labs, imaging, UDS were all unremarkable. He underwent EEG having 2 convulsive spells during study that were nonepileptic in nature.   -Given frequency and fluctuating nature of spells along with no post ictal period favor conversion d/o triggered by being away from home and work stressors.   -Neurology following, have d/w him today. Initially we tried to control his symptoms on Lexapro and hydroxyzine however he continues to have spells. Dr. Avila recommends starting Ativan and continued observation until his symptoms are controlled.    DVT prophylaxis:  Mechanical DVT prophylaxis orders are present.       AM-PAC 6 Clicks Score (PT): 22 (04/22/22 0810)    Disposition: I expect the patient to be discharged TBD.    CODE STATUS:   Code Status and Medical Interventions:   Ordered at: 04/19/22 2031     Code Status (Patient has no pulse and is not breathing):    CPR (Attempt to Resuscitate)     Medical Interventions (Patient has pulse or is breathing):    Full Support       Duyen Barnes II, DO  04/22/22

## 2022-04-23 VITALS
OXYGEN SATURATION: 97 % | WEIGHT: 189 LBS | SYSTOLIC BLOOD PRESSURE: 127 MMHG | BODY MASS INDEX: 29.66 KG/M2 | TEMPERATURE: 98.5 F | HEIGHT: 67 IN | RESPIRATION RATE: 18 BRPM | DIASTOLIC BLOOD PRESSURE: 77 MMHG | HEART RATE: 88 BPM

## 2022-04-23 PROCEDURE — 99217 PR OBSERVATION CARE DISCHARGE MANAGEMENT: CPT | Performed by: INTERNAL MEDICINE

## 2022-04-23 PROCEDURE — G0378 HOSPITAL OBSERVATION PER HR: HCPCS

## 2022-04-23 PROCEDURE — 96375 TX/PRO/DX INJ NEW DRUG ADDON: CPT

## 2022-04-23 PROCEDURE — 99213 OFFICE O/P EST LOW 20 MIN: CPT | Performed by: PSYCHIATRY & NEUROLOGY

## 2022-04-23 PROCEDURE — 25010000002 DIAZEPAM PER 5 MG: Performed by: INTERNAL MEDICINE

## 2022-04-23 RX ORDER — DIAZEPAM 5 MG/1
5 TABLET ORAL EVERY 8 HOURS PRN
Status: DISCONTINUED | OUTPATIENT
Start: 2022-04-23 | End: 2022-04-23 | Stop reason: HOSPADM

## 2022-04-23 RX ORDER — LORAZEPAM 1 MG/1
1 TABLET ORAL 2 TIMES DAILY
Qty: 6 TABLET | Refills: 0 | Status: SHIPPED | OUTPATIENT
Start: 2022-04-23

## 2022-04-23 RX ORDER — DIAZEPAM 5 MG/ML
5 INJECTION, SOLUTION INTRAMUSCULAR; INTRAVENOUS ONCE
Status: COMPLETED | OUTPATIENT
Start: 2022-04-23 | End: 2022-04-23

## 2022-04-23 RX ORDER — DIAZEPAM 5 MG/1
5 TABLET ORAL EVERY 8 HOURS
Qty: 6 TABLET | Refills: 0 | Status: SHIPPED | OUTPATIENT
Start: 2022-04-23 | End: 2022-04-25

## 2022-04-23 RX ADMIN — LORAZEPAM 0.5 MG: 0.5 TABLET ORAL at 12:00

## 2022-04-23 RX ADMIN — ESCITALOPRAM OXALATE 10 MG: 10 TABLET ORAL at 08:49

## 2022-04-23 RX ADMIN — DIAZEPAM 5 MG: 5 INJECTION, SOLUTION INTRAMUSCULAR; INTRAVENOUS at 12:00

## 2022-04-23 RX ADMIN — LORAZEPAM 0.5 MG: 0.5 TABLET ORAL at 06:07

## 2022-04-23 NOTE — PROGRESS NOTES
Subjective:    CC: Juan Feliciano is seen today for Seizures       HPI:  Patient had about 17 seizure-like episodes last night for which she got Ativan 2 mg.  He had another episode while I was in the room where he stiffened up and had mild whole-body shaking where he started to turn red with his eyes rolled back.  The episode lasted for about 20 seconds and patient returned to his baseline right away answering questions.  His MRI brain in the hospital was normal.  EEG captured several of his typical spells with no associated epileptiform activity.  Patient and his father tell me that patient he only started having these episodes a few weeks ago when he came from [place] to [place] on a work project.  He does have a history of a traumatic childhood as both his parents abused drugs and he was left to fend for himself.  When he was little his father moved out of the house.  At age 7  came to his house and arrested his mother for drug possession.  Patient grew up with his grandparents.  Patient states he has forgiven his parents and has moved on in life.      Current Facility-Administered Medications:   •  acetaminophen (TYLENOL) tablet 650 mg, 650 mg, Oral, Q4H PRN, Kathy Miranda PA-C, 650 mg at 04/22/22 1117  •  escitalopram (LEXAPRO) tablet 10 mg, 10 mg, Oral, Daily, Duyen Barnes II, DO, 10 mg at 04/23/22 0849  •  LORazepam (ATIVAN) tablet 0.5 mg, 0.5 mg, Oral, Q6H, Duyen Barnes II, DO, 0.5 mg at 04/23/22 0607  •  melatonin tablet 5 mg, 5 mg, Oral, Nightly PRN, Kathy Miranda PA-C, 5 mg at 04/22/22 2145  •  ondansetron (ZOFRAN) tablet 4 mg, 4 mg, Oral, Q6H PRN **OR** ondansetron (ZOFRAN) injection 4 mg, 4 mg, Intravenous, Q6H PRN, Kathy Miranda PA-C  •  [COMPLETED] Insert peripheral IV, , , Once **AND** sodium chloride 0.9 % flush 10 mL, 10 mL, Intravenous, PRN, Lovely Green, APRN  •  sodium chloride 0.9 % flush 10 mL, 10 mL, Intravenous, Q12H,  "Kathy Miranda PA-C, 10 mL at 04/22/22 2144       History reviewed. No pertinent past medical history.     Past Surgical History:   Procedure Laterality Date   • APPENDECTOMY     • LASIK Right         Family History   Problem Relation Age of Onset   • No Known Problems Mother    • Diabetes Father         Social History     Socioeconomic History   • Marital status: Single   Tobacco Use   • Smoking status: Never Smoker   • Smokeless tobacco: Current User     Types: Chew   Vaping Use   • Vaping Use: Every day   • Substances: Nicotine, THC, Flavoring   Substance and Sexual Activity   • Alcohol use: Yes     Alcohol/week: 7.0 standard drinks     Types: 7 Cans of beer per week   • Drug use: Yes     Frequency: 1.0 times per week     Types: Cocaine(coke), Marijuana       Review of Systems Pertinent items are noted in HPI, all other systems reviewed and negative     Objective:    /64 (BP Location: Left arm, Patient Position: Lying)   Pulse 61   Temp 97.9 °F (36.6 °C) (Oral)   Resp 17   Ht 170.2 cm (67\")   Wt 85.7 kg (189 lb)   SpO2 94%   BMI 29.60 kg/m²       Neurology Exam:    General appearance: Sitting up in bed in no acute distress except when he had his episode    Mental status: Alert, awake and oriented to time place and person.    Recent and Remote memory: Intact.    Attention span and Concentration: Normal.     Language and Speech: Intact- No dysarthria.    Fluency, Naming, Repetition and Comprehension:  Intact    Cranial Nerves:   CN II: Visual fields are full. Intact. Fundi - Normal, No papilledema, Pupils - SHIRA  CN III, IV and VI: Extraocular movements are intact. Normal saccades.   CN V: Facial sensation is intact.   CN VII: Muscles of facial expression reveal no asymmetry. Intact.   CN VIII: Hearing is intact. Whispered voice intact.   CN IX and X: Palate elevates symmetrically. Intact  CN XI: Shoulder shrug is intact.   CN XII: Tongue is midline without evidence of atrophy or " fasciculation.     Motor:  Right UE muscle strength 5/5. Normal tone.     Left UE muscle strength 5/5. Normal tone.      Right LE muscle strength5/5. Normal tone.     Left LE muscle strength 5/5. Normal tone.      Sensory: Normal light touch, vibration and pinprick sensation bilaterally.    DTRs: 2+ bilaterally in upper and lower extremities.    Babinski: Negative bilaterally.    Coordination: Normal finger-to-nose, heel to shin B/L.    Gait: Deferred    Ophthalmoscopic examination-no papilledema noted    Cardiac examination -normal rate and rhythm    Labs:  Most recent labs have been reviewed.    Results from last 7 days   Lab Units 04/22/22  0751   WBC 10*3/mm3 6.57   HEMOGLOBIN g/dL 14.7   HEMATOCRIT % 45.0   PLATELETS 10*3/mm3 312     Results from last 7 days   Lab Units 04/22/22  0751   SODIUM mmol/L 140   POTASSIUM mmol/L 4.0   CHLORIDE mmol/L 105   CO2 mmol/L 24.0   BUN mg/dL 19   CREATININE mg/dL 1.00   CALCIUM mg/dL 9.5       Radiology: No radiology results for the last day      Assessment and Plan:  27-year-old male with no significant past medical history other than occasional marijuana use presents with seizure-like episodes.  EEG captured several spells with no epileptiform activity.  MRI brain was unremarkable    1.  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  Patient still does not seem to accept his diagnosis fully  -He wants to go back to [place] and I have advised him to get Ativan 1 mg twice a day for 2 days for his car journey (father will be driving)  -Once in [place] he should establish care with a psychotherapist and undergo regular counseling  -Also counseled on seizure precautions including no driving till these episodes are well controlled    Joanna Ceran MD 04/23/22 10:28 EDT

## 2022-04-23 NOTE — PLAN OF CARE
"Goal Outcome Evaluation:      Pt experiencing 15+ back to back episodes of rhythmic jerking with accompanying tachycardia, hypoxia, rigidity, diaphoresis, and dry heaving. No loss of memory, bowel, or bladder. Upon seizing pt was placed on side and suctioning was done when appropriate. Telemetry applied after RN noted HR >200 during one of the seizing episodes. Hospitalist and on call neurology  paged and one time dose of 2mg ativan given in addition to 0.5 mg PO ativan. Pt inquired about dosage of ativan being given and commented, \"I want to be able to see shadow people like I did before when they gave me the bigger dose.\"    Pt remained on Facetime with brother and significant other while experiencing the jerking episodes. Brother verbally harassed and cursed at nursing staff. No episodes noted while asleep. VSS on 2L NC overnight.     "

## 2022-04-23 NOTE — PLAN OF CARE
"Goal Outcome Evaluation:  Plan of Care Reviewed With: patient        Progress: no change  Outcome Evaluation: pt experienced 12-15 episodes of seizure like activity today, on several occassions episodes would be 2-3 in a row. placed on 2L, pt states he is able to feel one coming on \"insides start tremoring, head feels foggy\", pt was given 2 mg IV Ativan and within a minute experienced a min long episode. after that able to sleep for 3.5 hrs. once woken up he started experiencing more seizure like activities.  "

## 2022-04-23 NOTE — DISCHARGE SUMMARY
[facility] Medicine Services  DISCHARGE SUMMARY    Patient Name: Juan Feliciano  : 1995  MRN: [id number]    Date of Admission: 2022  2:35 PM  Date of Discharge:  2022  Primary Care Physician: Provider, No Known    Consults     Date and Time Order Name Status Description    2022 12:34 AM Inpatient Neurology Consult General Completed           Hospital Course     Presenting Problem:   Seizure-like activity (HCC) [R56.9]    Active Hospital Problems   No active problems to display.      Resolved Hospital Problems    Diagnosis Date Resolved POA   • Seizure-like activity (HCC) [R56.9] 2022 Yes          Hospital Course:  Juan Feliciano is a 27 y.o. male with recent stressors who has been working 7 days a week and works outside presenting with convulsive type episodes.     Upon arrival patient underwent extensive evaluation. Labs, imaging, UDS were all unremarkable. He underwent EEG having 2 convulsive spells during study that were nonepileptic in nature. 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 Episodes are not consistent with seiaures. This was d/w he and his father via phone. He works as a  so I recommended that he not climb to any heights at this time until his spells are under control. We also discussed not driving for 3 months. Discussed extensively with father at the bedside. Patient is appropraite for discharge and I think it is in his best interest to return to [place] and establish with a psychiatrist. Father plans on taking the patient directly to another hospital system near home. Will start him on Lexapro w/ prn hydroxyzine for spells. For the drive home, I have provided scheduled valium and some PRN ativan to try to minimize he  episodes on the drive. He will need to see his PCP as soon as able. Discussed at length with him and his father at the bedside. Also discussed with neurologist.       Discharge Follow Up Recommendations for outpatient labs/diagnostics:  Follow-up with PCP as soon as possible once home in [place] to help facility psychiatry following and monitor medications.   Follow-up with psychiatrist as soon as possible.       Day of Discharge     HPI:   Patient continue to have spells but otherwise has no complaints. Says that he doesn't have spells when he is sleeping, only awake.     Review of Systems  General: denies fevers or chills  CV: denies chest pain  Resp: denies shortness of breath  Abd: denies abd pain, nausea      Vital Signs:   Temp:  [97.4 °F (36.3 °C)-98.3 °F (36.8 °C)] 97.9 °F (36.6 °C)  Heart Rate:  [61-97] 61  Resp:  [16-20] 17  BP: (108-132)/(55-78) 132/64  Flow (L/min):  [2] 2      Physical Exam:  Constitutional: No acute distress, awake, alert  Respiratory: Clear to auscultation bilaterally, respiratory effort normal   Cardiovascular: RRR, no murmurs, rubs, or gallops  Gastrointestinal: Positive bowel sounds, soft, nontender, nondistended  Musculoskeletal: No bilateral ankle edema  Psychiatric: Appropriate affect, cooperative  Neurologic: Had multiple episodes of eyes rolling into the back of his head and upper arm shakes. Unresponsive during the episodes that last about 30 seconds. No post ictal state  Skin: No rashes      Pertinent  and/or Most Recent Results     LAB RESULTS:      Lab 04/22/22  0751 04/21/22  0734 04/20/22  0352 04/19/22  1444   WBC 6.57 6.77 9.07 8.83   HEMOGLOBIN 14.7 14.2 13.2 13.6   HEMATOCRIT 45.0 42.7 39.6 41.6   PLATELETS 312 304 288 283   NEUTROS ABS 3.10 3.32 4.75 5.80   IMMATURE GRANS (ABS) 0.03 0.03 0.04 0.03   LYMPHS ABS 2.53 2.51 3.17* 2.15   MONOS ABS 0.72 0.75 0.92* 0.73   EOS ABS 0.16 0.14 0.15 0.09   MCV 96.4 94.7 93.2 96.7         Lab 04/22/22  0751 04/21/22  0734  04/20/22  0352 04/19/22  1444   SODIUM 140 142 141 141   POTASSIUM 4.0 4.6 3.7 3.6   CHLORIDE 105 109* 108* 104   CO2 24.0 25.0 26.0 24.0   ANION GAP 11.0 8.0 7.0 13.0   BUN 19 12 13 14   CREATININE 1.00 0.95 0.92 0.97   EGFR 106.5 113.2 117.7 110.4   GLUCOSE 94 100* 95 90   CALCIUM 9.5 9.2 8.9 9.2   MAGNESIUM  --   --   --  2.1         Lab 04/19/22  1444   TOTAL PROTEIN 6.9   ALBUMIN 4.60   GLOBULIN 2.3   ALT (SGPT) 17   AST (SGOT) 19   BILIRUBIN 0.3   ALK PHOS 95                     Brief Urine Lab Results  (Last result in the past 365 days)      Color   Clarity   Blood   Leuk Est   Nitrite   Protein   CREAT   Urine HCG        04/19/22 1626 Yellow   Clear   Negative   Negative   Negative   Negative               Microbiology Results (last 10 days)     Procedure Component Value - Date/Time    COVID PRE-OP / PRE-PROCEDURE SCREENING ORDER (NO ISOLATION) - Swab, Nasopharynx [[id number]]  (Normal) Collected: 04/19/22 1837    Lab Status: Final result Specimen: Swab from Nasopharynx Updated: 04/19/22 1909    Narrative:      The following orders were created for panel order COVID PRE-OP / PRE-PROCEDURE SCREENING ORDER (NO ISOLATION) - Swab, Nasopharynx.  Procedure                               Abnormality         Status                     ---------                               -----------         ------                     COVID-19 and FLU A/B PCR...[[id number]]  Normal              Final result                 Please view results for these tests on the individual orders.    COVID-19 and FLU A/B PCR - Swab, Nasopharynx [[id number]]  (Normal) Collected: 04/19/22 1837    Lab Status: Final result Specimen: Swab from Nasopharynx Updated: 04/19/22 1909     COVID19 Not Detected     Influenza A PCR Not Detected     Influenza B PCR Not Detected    Narrative:      Fact sheet for providers: https://www.fda.gov/media/423924/download    Fact sheet for patients: https://www.fda.gov/media/859441/download    Test performed by PCR.           EEG    Result Date: 4/20/2022  Reason for referral: 27 y.o.male with seizure-like activity Technical Summary:  A 19 channel digital EEG was performed using the international 10-20 placement system, including eye leads and EKG leads. Duration: 39 minutes Findings: The awake tracing shows diffuse low amplitude theta and alpha activity present symmetrically of both hemispheres.  At times a low amplitude 10 Hz posterior rhythm is evident symmetrically over the occipital leads.  As a study proceeds, the patient has several episodes of generalized tremoring and jerking of his arms and torso.  EMG and movement artifact are present during the episode.  Following cessation of the event, and normal background is seen without focal features noted.  Photic stimulation does not change the background but the patient reports feeling poorly.  Shortly after the end of photic stimulation the patient has a spell of generalized tremoring and jerking.  On the EMG and electrode movement artifact are seen during the spell.  Following cessation of the event, and normal background is seen.  Stage II sleep is not seen.  No epileptiform activity or electrographic seizures are captured. Video: On Technical quality: Good EKG: Regular, 70-80 bpm SUMMARY: Normal EEG background in the awake state Several recorded episodes of generalized jerking/tremoring have no EEG correlate No epileptiform activity or focal features are seen     Several recorded events, nonepileptic This report is transcribed using the Dragon dictation system.      CT Head Without Contrast    Result Date: 4/19/2022  CT HEAD WO CONTRAST-  Date of Exam: 4/19/2022 3:48 PM  Indication: headache, seizure.  Comparison Exams: None available.  Technique: Multiple axial images were obtained from the skull base to the vertex without the administration of IV contrast. The axial data was used to generate reformatted images in the coronal and sagittal planes. Automated exposure control  and iterative reconstruction methods were used.  FINDINGS: There is no acute infarct or hemorrhage.   No abnormal extra-axial fluid collections are seen.   There is no mass effect or hydrocephalus.  There is no evidence of skull fracture.   Visualized paranasal sinuses and mastoid air cells are clear.  The globes and orbits are within normal limits.       1. No acute intracranial abnormality.  This report was finalized on 4/19/2022 4:46 PM by Maikel Franks MD.      CT Cervical Spine Without Contrast    Result Date: 4/19/2022  DATE OF EXAM: 4/19/2022 3:48 PM  PROCEDURE: CT CERVICAL SPINE WO CONTRAST-  INDICATIONS: neck pain, new onset seizure  COMPARISON: No comparisons available.  TECHNIQUE: Routine transaxial slices were obtained through the cervical spine without the administration of intravenous contrast. Reconstructed coronal and sagittal images were also obtained. Automated exposure control and iterative construction methods were used.  The radiation dose reduction device was turned on for each scan per the ALARA (As Low as Reasonably Achievable) protocol.  FINDINGS: No evidence of fracture or subluxation. No significant degenerative changes. Prevertebral soft tissues normal      Negative  This report was finalized on 4/19/2022 4:05 PM by Wilmer Henriquez.      MRI Brain With & Without Contrast    Result Date: 4/19/2022  DATE OF EXAM: 4/19/2022 5:18 PM  PROCEDURE: MRI BRAIN W WO CONTRAST-  INDICATIONS: headache, dizziness, seizure vs sycope  COMPARISON: No comparisons available.  TECHNIQUE: Multiplanar multisequence images of the brain were performed prior to and after the uneventful intravenous contrast administration of 15 MultiHance.  FINDINGS: There are no foci of restricted diffusion. There are no intra-axial signal abnormalities. Specifically, there is no vasogenic edema, cortical signal abnormality, or abnormal T2 signal or atrophy of the mesial temporal region. There are no intra-axial or extra-axial  contrast enhancing abnormalities. No heterotopic gray matter or developmental anomaly is demonstrated. The CSF spaces/ventricles are normal. There are no abnormal extra-axial fluid collections or masses. Cerebellar tonsils are in normal position. No sellar/suprasellar lesion is demonstrated. The major intracranial flow voids are present. No skull lesion is demonstrated. No orbital abnormality is demonstrated. There is no fluid in the paranasal sinuses/middle ear cavities      Negative exam. No findings to account for seizure  This report was finalized on 4/19/2022 5:29 PM by Wilmer Henriquez.                    Plan for Follow-up of Pending Labs/Results:     Discharge Details        Discharge Medications      New Medications      Instructions Start Date   diazePAM 5 MG tablet  Commonly known as: VALIUM   5 mg, Oral, Every 8 Hours      escitalopram 10 MG tablet  Commonly known as: LEXAPRO   10 mg, Oral, Daily      hydrOXYzine 25 MG tablet  Commonly known as: ATARAX   25 mg, Oral, 3 Times Daily PRN      LORazepam 1 MG tablet  Commonly known as: Ativan   Take 1 tablet by mouth 2 (Two) Times a Day, can take an additional tab as needed for convulsions.             No Known Allergies      Discharge Disposition:  Home or Self Care    Diet:  Hospital:  Diet Order   Procedures   • Diet Regular       Activity:  Activity Instructions     Activity as Tolerated     Additional Activity Instructions:    Ambulate frequently as tolerated.            Restrictions or Other Recommendations:  No diving for 3 months.        CODE STATUS:    Code Status and Medical Interventions:   Ordered at: 04/19/22 2031     Code Status (Patient has no pulse and is not breathing):    CPR (Attempt to Resuscitate)     Medical Interventions (Patient has pulse or is breathing):    Full Support       No future appointments.    Additional Instructions for the Follow-ups that You Need to Schedule     Discharge Follow-up with PCP   As directed       Currently  Documented PCP:    Provider, No Known    PCP Phone Number:    None     Follow Up Details: 1 week         Discharge Follow-up with Specified Provider: Psychiatrist   As directed      To: Psychiatrist    Follow Up Details: As soon as feasible                     Jacquelin Wild MD  04/23/22      Time Spent on Discharge:  I spent  50 minutes on this discharge activity which included: face-to-face encounter with the patient, reviewing the data in the system, coordination of the care with the nursing staff as well as consultants, documentation, and entering orders.